# Patient Record
Sex: FEMALE | Race: WHITE | NOT HISPANIC OR LATINO
[De-identification: names, ages, dates, MRNs, and addresses within clinical notes are randomized per-mention and may not be internally consistent; named-entity substitution may affect disease eponyms.]

---

## 2019-01-09 ENCOUNTER — APPOINTMENT (OUTPATIENT)
Dept: INTERNAL MEDICINE | Facility: CLINIC | Age: 64
End: 2019-01-09
Payer: COMMERCIAL

## 2019-01-09 ENCOUNTER — LABORATORY RESULT (OUTPATIENT)
Age: 64
End: 2019-01-09

## 2019-01-09 VITALS
OXYGEN SATURATION: 98 % | WEIGHT: 109 LBS | DIASTOLIC BLOOD PRESSURE: 70 MMHG | HEART RATE: 73 BPM | BODY MASS INDEX: 20.58 KG/M2 | SYSTOLIC BLOOD PRESSURE: 110 MMHG | TEMPERATURE: 98.6 F | HEIGHT: 61 IN

## 2019-01-09 DIAGNOSIS — Z86.19 PERSONAL HISTORY OF OTHER INFECTIOUS AND PARASITIC DISEASES: ICD-10-CM

## 2019-01-09 DIAGNOSIS — Z78.9 OTHER SPECIFIED HEALTH STATUS: ICD-10-CM

## 2019-01-09 DIAGNOSIS — Z82.49 FAMILY HISTORY OF ISCHEMIC HEART DISEASE AND OTHER DISEASES OF THE CIRCULATORY SYSTEM: ICD-10-CM

## 2019-01-09 DIAGNOSIS — Z80.8 FAMILY HISTORY OF MALIGNANT NEOPLASM OF OTHER ORGANS OR SYSTEMS: ICD-10-CM

## 2019-01-09 DIAGNOSIS — Z80.42 FAMILY HISTORY OF MALIGNANT NEOPLASM OF PROSTATE: ICD-10-CM

## 2019-01-09 DIAGNOSIS — Z83.518 FAMILY HISTORY OF OTHER SPECIFIED EYE DISORDER: ICD-10-CM

## 2019-01-09 DIAGNOSIS — Z82.61 FAMILY HISTORY OF ARTHRITIS: ICD-10-CM

## 2019-01-09 PROCEDURE — 99203 OFFICE O/P NEW LOW 30 MIN: CPT

## 2019-01-09 NOTE — PAST MEDICAL HISTORY
[Menopause Age____] : age at menopause was [unfilled] [Total Preg ___] : G[unfilled] [Living ___] : Living: [unfilled] [AB Spont ___] : miscarriages: [unfilled]

## 2019-01-11 PROBLEM — Z80.8 FAMILY HISTORY OF MALIGNANT MELANOMA: Status: ACTIVE | Noted: 2019-01-11

## 2019-01-11 PROBLEM — Z80.42 FAMILY HISTORY OF MALIGNANT NEOPLASM OF PROSTATE: Status: ACTIVE | Noted: 2019-01-11

## 2019-01-11 PROBLEM — Z82.49 FAMILY HISTORY OF CORONARY ARTERY DISEASE: Status: ACTIVE | Noted: 2019-01-11

## 2019-01-11 PROBLEM — Z83.518 FAMILY HISTORY OF MACULAR DEGENERATION: Status: ACTIVE | Noted: 2019-01-11

## 2019-01-11 PROBLEM — Z82.49 FAMILY HISTORY OF HYPERTENSION: Status: ACTIVE | Noted: 2019-01-11

## 2019-01-11 PROBLEM — Z82.49 FAMILY HISTORY OF CARDIAC PACEMAKER: Status: ACTIVE | Noted: 2019-01-11

## 2019-01-11 PROBLEM — Z82.61 FAMILY HISTORY OF RHEUMATOID ARTHRITIS: Status: ACTIVE | Noted: 2019-01-11

## 2019-01-11 LAB
APPEARANCE: CLEAR
BACTERIA UR CULT: NORMAL
BILIRUBIN URINE: NEGATIVE
BLOOD URINE: ABNORMAL
COLOR: YELLOW
GLUCOSE QUALITATIVE U: NEGATIVE MG/DL
KETONES URINE: ABNORMAL
LEUKOCYTE ESTERASE URINE: NEGATIVE
NITRITE URINE: NEGATIVE
PH URINE: 5.5
PROTEIN URINE: NEGATIVE MG/DL
SPECIFIC GRAVITY URINE: 1.03
UROBILINOGEN URINE: NEGATIVE MG/DL
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
## 2019-01-11 NOTE — HEALTH RISK ASSESSMENT
[Patient reported mammogram was normal] : Patient reported mammogram was normal [Patient reported PAP Smear was normal] : Patient reported PAP Smear was normal [Patient reported colonoscopy was normal] : Patient reported colonoscopy was normal [0] : 2) Feeling down, depressed, or hopeless: Not at all (0) [MammogramDate] : 12/31/18 [PapSmearDate] : 2016 [BoneDensityDate] : 12/31/18 [BoneDensityComments] : osteoporosis [ColonoscopyDate] : 2012

## 2019-01-11 NOTE — PHYSICAL EXAM
[No Acute Distress] : no acute distress [Well-Appearing] : well-appearing [Normal Sclera/Conjunctiva] : normal sclera/conjunctiva [PERRL] : pupils equal round and reactive to light [EOMI] : extraocular movements intact [Normal Outer Ear/Nose] : the outer ears and nose were normal in appearance [Normal Oropharynx] : the oropharynx was normal [Normal TMs] : both tympanic membranes were normal [Normal Nasal Mucosa] : the nasal mucosa was normal [Supple] : supple [No Lymphadenopathy] : no lymphadenopathy [Thyroid Normal, No Nodules] : the thyroid was normal and there were no nodules present [No Respiratory Distress] : no respiratory distress  [Clear to Auscultation] : lungs were clear to auscultation bilaterally [Normal Rate] : normal rate  [Regular Rhythm] : with a regular rhythm [Normal S1, S2] : normal S1 and S2 [No Murmur] : no murmur heard [Pedal Pulses Present] : the pedal pulses are present [No Edema] : there was no peripheral edema [Soft] : abdomen soft [Non Tender] : non-tender [No HSM] : no HSM [Normal Supraclavicular Nodes] : no supraclavicular lymphadenopathy [Normal Posterior Cervical Nodes] : no posterior cervical lymphadenopathy [Normal Anterior Cervical Nodes] : no anterior cervical lymphadenopathy [No CVA Tenderness] : no CVA  tenderness [No Spinal Tenderness] : no spinal tenderness [No Joint Swelling] : no joint swelling [Grossly Normal Strength/Tone] : grossly normal strength/tone [No Rash] : no rash [Normal Gait] : normal gait [No Focal Deficits] : no focal deficits [Normal Affect] : the affect was normal [Alert and Oriented x3] : oriented to person, place, and time [de-identified] : scattered freckles

## 2019-01-11 NOTE — DATA REVIEWED
[FreeTextEntry1] :  outside labs- to be scanned into chart\par \par 12/19/18\par TSH 3.69, wnl\par FT41.1, wnl\par UA: trace blood, no prt (no micro)\par Tchol 217 TG 72  HDL 92\par A1c 5.3\par cmp wnl\par cbc wnl\par \par IFOBT: negative\par

## 2019-01-11 NOTE — REVIEW OF SYSTEMS
[Negative] : Psychiatric [Chest Pain] : no chest pain [Palpitations] : no palpitations [Cough] : no cough [Dyspnea on Exertion] : no dyspnea on exertion [Dizziness] : no dizziness

## 2019-01-11 NOTE — ASSESSMENT
[FreeTextEntry1] : \par microscopic hematuria- noted on screening labs by prior PMD (no micro); asx\par -check UA with micro and Ucx\par -advised  eval if repeat remains able, agreeable\par \par osteoporosis-\par -hx DEXA 12/18, to be scanned into chart.  Repeat in 2 yrs.\par -to start alendronate soon, SEs discussed\par -on ca/D\par -exercise and low ETOH intake advised\par \par HLD- 12/18 Tchol 217 TG 72  HDL 92, LFTs wnl\par -on simvastatin 5mg\par -low fat diet, exercise advised\par -wants to check lipids in 6 months, will f/u in office\par \par \par HCM\par -hx CPE 11/18 by prior PMD, yearly advised\par -pt defers hep C screening to next labs\par -hx flu shot 11/18\par -hx Tdap 2015\par -hx prevnar 11/18, for PVX in 1 yr\par -advised to check on insurance coverage for shingles vaccine and f/u or go to pharm if desires\par -hx negative PAP 2016 by GYN, Dr. Duran\par -hx negative mammo 12/18, report to be scanned into chart\par -hx nl screening colonoscopy 2012, told to repeat in 10 yrs per pt.  Asked to forward records. 12/18 IFOBT negative\par -derm referral for screening (+FH melanoma).  Regular use of sun block for skin cancer prevention advised.\par -yearly dental screening advised, has own\par -yearly eye screening advised, has own seen 12/18\par \par \par Pt's cell: [phone]

## 2019-02-14 ENCOUNTER — TRANSCRIPTION ENCOUNTER (OUTPATIENT)
Age: 64
End: 2019-02-14

## 2019-03-04 ENCOUNTER — APPOINTMENT (OUTPATIENT)
Dept: UROLOGY | Facility: CLINIC | Age: 64
End: 2019-03-04
Payer: COMMERCIAL

## 2019-03-04 VITALS
DIASTOLIC BLOOD PRESSURE: 83 MMHG | HEART RATE: 96 BPM | RESPIRATION RATE: 16 BRPM | SYSTOLIC BLOOD PRESSURE: 126 MMHG | BODY MASS INDEX: 20.77 KG/M2 | TEMPERATURE: 98.2 F | WEIGHT: 110 LBS | HEIGHT: 61 IN

## 2019-03-04 PROCEDURE — 99203 OFFICE O/P NEW LOW 30 MIN: CPT

## 2019-03-04 NOTE — ASSESSMENT
[FreeTextEntry1] : \par \par Impression/Plan:63  y.o   2 , 2 miscarriages , woman referred by her Internist Dr Cornejo, for first time episode of microscopic hematuria , 5 RBC on Microscopic analysis of urine, neg urine cx. No radiologic imaging done and denies gross hematuria.Denies urologic hx , denies smoking hx. Works as a  professor. \par 1.CT Urogram.Call for results .\par 2.RTO for cystoscopy.Urine for cytology today and call for results in 3 days. \par

## 2019-03-04 NOTE — PHYSICAL EXAM
[General Appearance - Well Developed] : well developed [Normal Appearance] : normal appearance [Well Groomed] : well groomed [] : no respiratory distress [Oriented To Time, Place, And Person] : oriented to person, place, and time

## 2019-03-04 NOTE — HISTORY OF PRESENT ILLNESS
[FreeTextEntry1] : 63  y.o   2 , 2 miscarriages , woman referred by her Internist Dr Cornejo, for first time episode of microscopic hematuria , 5 RBC on Microscopic analysis of urine, neg urine cx. No radiologic imaging done and denies gross hematuria.Denies urologic hx .FMH brother with PCa .\par \par Evaluation of Voiding Symptoms:\par \par Force of stream: strong. \par Hesitancy: 0\par Intermittency: 0\par Dribblin\par Daytime frequency: 3-4 h\par Night time frequency: 0-1\par Dysuria: 0\par Urgency: 0\par Urge Incontinence: 0\par Stress Incontinence: 0\par Pad usage: 0\par Straining to void: 0\par Incomplete emptyin\par UTIs:  0\par Hematuria: 0\par Stone disease:0\par STD: 0\par Bowel issues: 0\par \par

## 2019-03-05 ENCOUNTER — OTHER (OUTPATIENT)
Age: 64
End: 2019-03-05

## 2019-03-05 LAB — URINE CYTOLOGY: NORMAL

## 2019-03-07 ENCOUNTER — TRANSCRIPTION ENCOUNTER (OUTPATIENT)
Age: 64
End: 2019-03-07

## 2019-03-11 ENCOUNTER — FORM ENCOUNTER (OUTPATIENT)
Age: 64
End: 2019-03-11

## 2019-03-12 ENCOUNTER — APPOINTMENT (OUTPATIENT)
Dept: CT IMAGING | Facility: IMAGING CENTER | Age: 64
End: 2019-03-12
Payer: COMMERCIAL

## 2019-03-12 ENCOUNTER — OUTPATIENT (OUTPATIENT)
Dept: OUTPATIENT SERVICES | Facility: HOSPITAL | Age: 64
LOS: 1 days | End: 2019-03-12
Payer: COMMERCIAL

## 2019-03-12 DIAGNOSIS — R31.29 OTHER MICROSCOPIC HEMATURIA: ICD-10-CM

## 2019-03-12 PROCEDURE — 74178 CT ABD&PLV WO CNTR FLWD CNTR: CPT

## 2019-03-12 PROCEDURE — 82565 ASSAY OF CREATININE: CPT

## 2019-03-12 PROCEDURE — 74178 CT ABD&PLV WO CNTR FLWD CNTR: CPT | Mod: 26

## 2019-04-11 ENCOUNTER — OUTPATIENT (OUTPATIENT)
Dept: OUTPATIENT SERVICES | Facility: HOSPITAL | Age: 64
LOS: 1 days | End: 2019-04-11
Payer: COMMERCIAL

## 2019-04-11 ENCOUNTER — APPOINTMENT (OUTPATIENT)
Dept: UROLOGY | Facility: CLINIC | Age: 64
End: 2019-04-11
Payer: COMMERCIAL

## 2019-04-11 VITALS
SYSTOLIC BLOOD PRESSURE: 158 MMHG | HEART RATE: 67 BPM | RESPIRATION RATE: 16 BRPM | WEIGHT: 110 LBS | HEIGHT: 61 IN | BODY MASS INDEX: 20.77 KG/M2 | DIASTOLIC BLOOD PRESSURE: 90 MMHG

## 2019-04-11 DIAGNOSIS — R35.0 FREQUENCY OF MICTURITION: ICD-10-CM

## 2019-04-11 PROCEDURE — 52000 CYSTOURETHROSCOPY: CPT

## 2019-04-15 DIAGNOSIS — R31.29 OTHER MICROSCOPIC HEMATURIA: ICD-10-CM

## 2019-06-10 ENCOUNTER — TRANSCRIPTION ENCOUNTER (OUTPATIENT)
Age: 64
End: 2019-06-10

## 2019-06-18 ENCOUNTER — LABORATORY RESULT (OUTPATIENT)
Age: 64
End: 2019-06-18

## 2019-06-18 ENCOUNTER — APPOINTMENT (OUTPATIENT)
Dept: DERMATOLOGY | Facility: CLINIC | Age: 64
End: 2019-06-18
Payer: COMMERCIAL

## 2019-06-18 VITALS — BODY MASS INDEX: 20.77 KG/M2 | WEIGHT: 110 LBS | HEIGHT: 61 IN

## 2019-06-18 DIAGNOSIS — D18.01 HEMANGIOMA OF SKIN AND SUBCUTANEOUS TISSUE: ICD-10-CM

## 2019-06-18 DIAGNOSIS — D48.5 NEOPLASM OF UNCERTAIN BEHAVIOR OF SKIN: ICD-10-CM

## 2019-06-18 PROCEDURE — 11102 TANGNTL BX SKIN SINGLE LES: CPT

## 2019-06-18 PROCEDURE — 99204 OFFICE O/P NEW MOD 45 MIN: CPT | Mod: 25

## 2019-07-08 ENCOUNTER — FORM ENCOUNTER (OUTPATIENT)
Age: 64
End: 2019-07-08

## 2019-07-09 ENCOUNTER — APPOINTMENT (OUTPATIENT)
Dept: INTERNAL MEDICINE | Facility: CLINIC | Age: 64
End: 2019-07-09
Payer: COMMERCIAL

## 2019-07-09 ENCOUNTER — RESULT REVIEW (OUTPATIENT)
Age: 64
End: 2019-07-09

## 2019-07-09 ENCOUNTER — APPOINTMENT (OUTPATIENT)
Dept: OBGYN | Facility: CLINIC | Age: 64
End: 2019-07-09
Payer: COMMERCIAL

## 2019-07-09 VITALS
SYSTOLIC BLOOD PRESSURE: 144 MMHG | OXYGEN SATURATION: 98 % | HEIGHT: 61 IN | DIASTOLIC BLOOD PRESSURE: 80 MMHG | HEART RATE: 64 BPM | WEIGHT: 110 LBS | TEMPERATURE: 98.1 F | RESPIRATION RATE: 16 BRPM | BODY MASS INDEX: 20.77 KG/M2

## 2019-07-09 PROCEDURE — 36415 COLL VENOUS BLD VENIPUNCTURE: CPT

## 2019-07-09 PROCEDURE — 99214 OFFICE O/P EST MOD 30 MIN: CPT | Mod: 25

## 2019-07-09 PROCEDURE — 99397 PER PM REEVAL EST PAT 65+ YR: CPT

## 2019-07-09 NOTE — REVIEW OF SYSTEMS
[Negative] : Psychiatric [Cough] : no cough [Palpitations] : no palpitations [Chest Pain] : no chest pain [Dyspnea on Exertion] : no dyspnea on exertion [Dizziness] : no dizziness

## 2019-07-09 NOTE — PHYSICAL EXAM
[No Acute Distress] : no acute distress [Well-Appearing] : well-appearing [Normal Sclera/Conjunctiva] : normal sclera/conjunctiva [PERRL] : pupils equal round and reactive to light [EOMI] : extraocular movements intact [Normal Oropharynx] : the oropharynx was normal [Normal Outer Ear/Nose] : the outer ears and nose were normal in appearance [Normal Nasal Mucosa] : the nasal mucosa was normal [Supple] : supple [No Lymphadenopathy] : no lymphadenopathy [Thyroid Normal, No Nodules] : the thyroid was normal and there were no nodules present [No Respiratory Distress] : no respiratory distress  [Clear to Auscultation] : lungs were clear to auscultation bilaterally [Normal Rate] : normal rate  [Regular Rhythm] : with a regular rhythm [Normal S1, S2] : normal S1 and S2 [No Murmur] : no murmur heard [Pedal Pulses Present] : the pedal pulses are present [No Edema] : there was no peripheral edema [Soft] : abdomen soft [Non Tender] : non-tender [No HSM] : no HSM [Normal Supraclavicular Nodes] : no supraclavicular lymphadenopathy [Normal Posterior Cervical Nodes] : no posterior cervical lymphadenopathy [Normal Anterior Cervical Nodes] : no anterior cervical lymphadenopathy [No CVA Tenderness] : no CVA  tenderness [No Spinal Tenderness] : no spinal tenderness [No Joint Swelling] : no joint swelling [Grossly Normal Strength/Tone] : grossly normal strength/tone [No Rash] : no rash [Normal Gait] : normal gait [No Focal Deficits] : no focal deficits [Normal Affect] : the affect was normal [Alert and Oriented x3] : oriented to person, place, and time [de-identified] : scattered freckles

## 2019-07-09 NOTE — HEALTH RISK ASSESSMENT
[Hepatitis C test offered] : Hepatitis C test offered [Fully functional (bathing, dressing, toileting, transferring, walking, feeding)] : Fully functional (bathing, dressing, toileting, transferring, walking, feeding) [Sunscreen] : uses sunscreen [Fully functional (using the telephone, shopping, preparing meals, housekeeping, doing laundry, using] : Fully functional and needs no help or supervision to perform IADLs (using the telephone, shopping, preparing meals, housekeeping, doing laundry, using transportation, managing medications and managing finances)
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
## 2019-07-09 NOTE — ASSESSMENT
[FreeTextEntry1] : \par hx microscopic hematuria- noted on screening labs by prior PMD (no micro); asx\par -1/19 UA +rbc, Ucx negative\par -followed by , seen 3/19 with CT A/P and cystoscopy advised f/u yearly\par -3/19 CT A/P: no mass/stones, small left renal cyst\par -4/19 cystoscopy negative\par \par osteoporosis-\par -hx DEXA 12/18, for repeat in 2 yrs.\par -on alendronate since 1/19\par -on ca/D\par -exercise and low ETOH intake advised\par \par HLD- 12/18 Tchol 217 TG 72  HDL 92, LFTs wnl\par -on simvastatin 5mg\par -low fat diet, exercise advised\par -check lipids \par \par BCC-\par -followed by derm, has f/u 7/17/19 re: MOHS procedure for removal\par -regular use of sun block for skin cancer prevention advised.\par \par \par HCM\par -hx CPE 11/18 by prior PMD, yearly advised\par -check hep C screening \par -hx flu shot 11/18\par -hx Tdap 2015\par -hx prevnar 11/18, for PVX in 1 yr\par -advised to check on insurance coverage for shingles vaccine and f/u or go to pharm if desires\par -hx negative PAP 2016 by GYN, Dr. Duran- has f/u today\par -hx negative mammo 12/18\par -hx nl screening colonoscopy 2012, told to repeat in 10 yrs per pt.  Asked to forward records. 12/18 IFOBT negative\par \par \par Pt's cell: [phone]

## 2019-07-10 LAB
CHOLEST SERPL-MCNC: 194 MG/DL
CHOLEST/HDLC SERPL: 2.3 RATIO
HCV AB SER QL: NONREACTIVE
HCV S/CO RATIO: 0.25 S/CO
HDLC SERPL-MCNC: 84 MG/DL
LDLC SERPL CALC-MCNC: 97 MG/DL
TRIGL SERPL-MCNC: 67 MG/DL

## 2019-07-17 ENCOUNTER — APPOINTMENT (OUTPATIENT)
Dept: DERMATOLOGY | Facility: CLINIC | Age: 64
End: 2019-07-17
Payer: COMMERCIAL

## 2019-07-17 PROCEDURE — 99214 OFFICE O/P EST MOD 30 MIN: CPT

## 2019-08-19 ENCOUNTER — APPOINTMENT (OUTPATIENT)
Dept: DERMATOLOGY | Facility: CLINIC | Age: 64
End: 2019-08-19
Payer: COMMERCIAL

## 2019-08-19 PROCEDURE — 13152 CMPLX RPR E/N/E/L 2.6-7.5 CM: CPT

## 2019-08-19 PROCEDURE — 17311 MOHS 1 STAGE H/N/HF/G: CPT

## 2019-08-26 ENCOUNTER — APPOINTMENT (OUTPATIENT)
Dept: DERMATOLOGY | Facility: CLINIC | Age: 64
End: 2019-08-26
Payer: COMMERCIAL

## 2019-08-26 PROCEDURE — 99024 POSTOP FOLLOW-UP VISIT: CPT

## 2019-10-05 ENCOUNTER — TRANSCRIPTION ENCOUNTER (OUTPATIENT)
Age: 64
End: 2019-10-05

## 2019-10-07 RX ORDER — SIMVASTATIN 5 MG/1
5 TABLET, FILM COATED ORAL
Qty: 90 | Refills: 1 | Status: DISCONTINUED | COMMUNITY
Start: 1900-01-01 | End: 2019-10-07

## 2019-10-08 RX ORDER — LOVASTATIN 10 MG/1
10 TABLET ORAL
Qty: 30 | Refills: 3 | Status: DISCONTINUED | COMMUNITY
Start: 2019-10-07 | End: 2019-10-08

## 2019-11-24 ENCOUNTER — TRANSCRIPTION ENCOUNTER (OUTPATIENT)
Age: 64
End: 2019-11-24

## 2020-01-04 ENCOUNTER — TRANSCRIPTION ENCOUNTER (OUTPATIENT)
Age: 65
End: 2020-01-04

## 2020-01-13 DIAGNOSIS — Z85.828 PERSONAL HISTORY OF OTHER MALIGNANT NEOPLASM OF SKIN: ICD-10-CM

## 2020-01-14 ENCOUNTER — APPOINTMENT (OUTPATIENT)
Dept: INTERNAL MEDICINE | Facility: CLINIC | Age: 65
End: 2020-01-14
Payer: COMMERCIAL

## 2020-01-14 ENCOUNTER — LABORATORY RESULT (OUTPATIENT)
Age: 65
End: 2020-01-14

## 2020-01-14 VITALS
TEMPERATURE: 98.1 F | DIASTOLIC BLOOD PRESSURE: 80 MMHG | BODY MASS INDEX: 20.01 KG/M2 | SYSTOLIC BLOOD PRESSURE: 132 MMHG | WEIGHT: 106 LBS | HEART RATE: 65 BPM | RESPIRATION RATE: 16 BRPM | HEIGHT: 61 IN | OXYGEN SATURATION: 98 %

## 2020-01-14 PROCEDURE — 99214 OFFICE O/P EST MOD 30 MIN: CPT | Mod: 25

## 2020-01-14 PROCEDURE — 36415 COLL VENOUS BLD VENIPUNCTURE: CPT
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
## 2020-01-14 NOTE — ASSESSMENT
[FreeTextEntry1] : \par osteoporosis-\par -hx DEXA 12/18, for repeat in 2 yrs.\par -on alendronate since 1/19\par -on ca/D\par -exercise and low ETOH intake advised\par \par HLD- 7/19 Tchol 194 (was 217) TG 67  HDL 84, 12/18 LFTs wnl\par -on simvastatin 5mg\par -low fat diet, exercise advised\par -check lipids \par \par BCC- is s/p MOHS 8/19\par -followed by derm, seen 8/19 with suture removal, to f/u q 6mo per pt- pending 2/20\par -regular use of sun block for skin cancer prevention advised.\par \par hx microscopic hematuria- noted on screening labs by prior PMD (no micro); asx\par -1/19 UA +rbc, Ucx negative\par -followed by , seen 3/19 with CT A/P and cystoscopy advised f/u yearly- pending\par -3/19 CT A/P: no mass/stones, small left renal cyst\par -4/19 cystoscopy negative\par -check UA\par \par \par HCM\par -hx CPE 11/18 by prior PMD, yearly advised\par -check screening labs; declines HIV/STD screening\par -7/19 hep C screening negative\par -hx flu shot 10/19\par -hx Tdap 2015\par -hx prevnar 11/18, for PVX in 1 yr\par -advised to check on insurance coverage for shingles vaccine and f/u or go to pharm if desires\par -hx negative PAP 7/19 by GYN, Dr. Duran\par -hx negative mammo 12/18, Rx for repeat given\par -hx nl screening colonoscopy 2012, told to repeat in 10 yrs per pt.  Asked to forward records. 12/18 IFOBT negative- for repeat\par \par \par Pt's cell: [phone]

## 2020-01-14 NOTE — REVIEW OF SYSTEMS
[Negative] : Psychiatric [Palpitations] : no palpitations [Dyspnea on Exertion] : no dyspnea on exertion [Dizziness] : no dizziness

## 2020-01-14 NOTE — PHYSICAL EXAM
[No Acute Distress] : no acute distress [Normal Sclera/Conjunctiva] : normal sclera/conjunctiva [PERRL] : pupils equal round and reactive to light [Well-Appearing] : well-appearing [EOMI] : extraocular movements intact [Normal Outer Ear/Nose] : the outer ears and nose were normal in appearance [Normal Oropharynx] : the oropharynx was normal [Supple] : supple [Normal Nasal Mucosa] : the nasal mucosa was normal [No Lymphadenopathy] : no lymphadenopathy [Clear to Auscultation] : lungs were clear to auscultation bilaterally [Thyroid Normal, No Nodules] : the thyroid was normal and there were no nodules present [No Respiratory Distress] : no respiratory distress  [Regular Rhythm] : with a regular rhythm [Normal Rate] : normal rate  [Pedal Pulses Present] : the pedal pulses are present [No Murmur] : no murmur heard [Normal S1, S2] : normal S1 and S2 [Soft] : abdomen soft [No Edema] : there was no peripheral edema [Non Tender] : non-tender [Normal Supraclavicular Nodes] : no supraclavicular lymphadenopathy [No HSM] : no HSM [Normal Posterior Cervical Nodes] : no posterior cervical lymphadenopathy [No CVA Tenderness] : no CVA  tenderness [Normal Anterior Cervical Nodes] : no anterior cervical lymphadenopathy [No Joint Swelling] : no joint swelling [No Spinal Tenderness] : no spinal tenderness [Grossly Normal Strength/Tone] : grossly normal strength/tone [Normal Gait] : normal gait [No Focal Deficits] : no focal deficits [No Rash] : no rash [Alert and Oriented x3] : oriented to person, place, and time [Normal Affect] : the affect was normal [de-identified] : scattered freckles

## 2020-01-17 LAB
25(OH)D3 SERPL-MCNC: 35 NG/ML
ALBUMIN SERPL ELPH-MCNC: 4.4 G/DL
ALP BLD-CCNC: 56 U/L
ALT SERPL-CCNC: 20 U/L
ANION GAP SERPL CALC-SCNC: 13 MMOL/L
APPEARANCE: CLEAR
AST SERPL-CCNC: 40 U/L
BACTERIA: NEGATIVE
BASOPHILS # BLD AUTO: 0.03 K/UL
BASOPHILS NFR BLD AUTO: 0.5 %
BILIRUB SERPL-MCNC: 0.5 MG/DL
BILIRUBIN URINE: NEGATIVE
BLOOD URINE: ABNORMAL
BUN SERPL-MCNC: 20 MG/DL
CALCIUM SERPL-MCNC: 9.6 MG/DL
CHLORIDE SERPL-SCNC: 98 MMOL/L
CHOLEST SERPL-MCNC: 203 MG/DL
CHOLEST/HDLC SERPL: 2.3 RATIO
CO2 SERPL-SCNC: 27 MMOL/L
COLOR: NORMAL
CREAT SERPL-MCNC: 0.77 MG/DL
EOSINOPHIL # BLD AUTO: 0.1 K/UL
EOSINOPHIL NFR BLD AUTO: 1.8 %
ESTIMATED AVERAGE GLUCOSE: 105 MG/DL
GLUCOSE QUALITATIVE U: NEGATIVE
GLUCOSE SERPL-MCNC: 83 MG/DL
HBA1C MFR BLD HPLC: 5.3 %
HCT VFR BLD CALC: 42.1 %
HDLC SERPL-MCNC: 89 MG/DL
HGB BLD-MCNC: 13.7 G/DL
HYALINE CASTS: 2 /LPF
IMM GRANULOCYTES NFR BLD AUTO: 0.2 %
KETONES URINE: NEGATIVE
LDLC SERPL CALC-MCNC: 96 MG/DL
LEUKOCYTE ESTERASE URINE: NEGATIVE
LYMPHOCYTES # BLD AUTO: 1.94 K/UL
LYMPHOCYTES NFR BLD AUTO: 34.6 %
MAN DIFF?: NORMAL
MCHC RBC-ENTMCNC: 31.4 PG
MCHC RBC-ENTMCNC: 32.5 GM/DL
MCV RBC AUTO: 96.6 FL
MICROSCOPIC-UA: NORMAL
MONOCYTES # BLD AUTO: 0.66 K/UL
MONOCYTES NFR BLD AUTO: 11.8 %
NEUTROPHILS # BLD AUTO: 2.87 K/UL
NEUTROPHILS NFR BLD AUTO: 51.1 %
NITRITE URINE: NEGATIVE
PH URINE: 6
PLATELET # BLD AUTO: 141 K/UL
POTASSIUM SERPL-SCNC: 5 MMOL/L
PROT SERPL-MCNC: 7.1 G/DL
PROTEIN URINE: NEGATIVE
RBC # BLD: 4.36 M/UL
RBC # FLD: 12.2 %
RED BLOOD CELLS URINE: 3 /HPF
SODIUM SERPL-SCNC: 138 MMOL/L
SPECIFIC GRAVITY URINE: 1.02
SQUAMOUS EPITHELIAL CELLS: 1 /HPF
TRIGL SERPL-MCNC: 92 MG/DL
TSH SERPL-ACNC: 7.91 UIU/ML
UROBILINOGEN URINE: NORMAL
WBC # FLD AUTO: 5.61 K/UL
WHITE BLOOD CELLS URINE: 1 /HPF

## 2020-01-17 RX ORDER — SIMVASTATIN 5 MG/1
5 TABLET, FILM COATED ORAL
Qty: 90 | Refills: 1 | Status: DISCONTINUED | COMMUNITY
Start: 2019-10-08 | End: 2020-01-17

## 2020-01-30 ENCOUNTER — TRANSCRIPTION ENCOUNTER (OUTPATIENT)
Age: 65
End: 2020-01-30

## 2020-02-21 LAB
BASOPHILS # BLD AUTO: 0.06 K/UL
BASOPHILS NFR BLD AUTO: 0.6 %
EOSINOPHIL # BLD AUTO: 0.07 K/UL
EOSINOPHIL NFR BLD AUTO: 0.7 %
HCT VFR BLD CALC: 43.1 %
HGB BLD-MCNC: 14.2 G/DL
IMM GRANULOCYTES NFR BLD AUTO: 0.4 %
LYMPHOCYTES # BLD AUTO: 2.15 K/UL
LYMPHOCYTES NFR BLD AUTO: 21.9 %
MAN DIFF?: NORMAL
MCHC RBC-ENTMCNC: 31.2 PG
MCHC RBC-ENTMCNC: 32.9 GM/DL
MCV RBC AUTO: 94.7 FL
MONOCYTES # BLD AUTO: 0.6 K/UL
MONOCYTES NFR BLD AUTO: 6.1 %
NEUTROPHILS # BLD AUTO: 6.91 K/UL
NEUTROPHILS NFR BLD AUTO: 70.3 %
PLATELET # BLD AUTO: 182 K/UL
RBC # BLD: 4.55 M/UL
RBC # FLD: 12.1 %
THYROGLOB AB SERPL-ACNC: <20 IU/ML
THYROPEROXIDASE AB SERPL IA-ACNC: <10 IU/ML
TSH SERPL-ACNC: 3.24 UIU/ML
WBC # FLD AUTO: 9.83 K/UL

## 2020-03-06 LAB — HEMOCCULT STL QL IA: NEGATIVE

## 2020-03-25 ENCOUNTER — TRANSCRIPTION ENCOUNTER (OUTPATIENT)
Age: 65
End: 2020-03-25

## 2020-05-07 ENCOUNTER — TRANSCRIPTION ENCOUNTER (OUTPATIENT)
Age: 65
End: 2020-05-07

## 2020-06-19 ENCOUNTER — TRANSCRIPTION ENCOUNTER (OUTPATIENT)
Age: 65
End: 2020-06-19

## 2020-07-29 ENCOUNTER — APPOINTMENT (OUTPATIENT)
Dept: DERMATOLOGY | Facility: CLINIC | Age: 65
End: 2020-07-29
Payer: COMMERCIAL

## 2020-07-29 DIAGNOSIS — L82.0 INFLAMED SEBORRHEIC KERATOSIS: ICD-10-CM

## 2020-07-29 PROCEDURE — 99213 OFFICE O/P EST LOW 20 MIN: CPT | Mod: 25

## 2020-07-29 PROCEDURE — 17110 DESTRUCTION B9 LES UP TO 14: CPT

## 2020-09-01 ENCOUNTER — APPOINTMENT (OUTPATIENT)
Dept: INTERNAL MEDICINE | Facility: CLINIC | Age: 65
End: 2020-09-01

## 2020-09-16 ENCOUNTER — MED ADMIN CHARGE (OUTPATIENT)
Age: 65
End: 2020-09-16

## 2020-09-16 ENCOUNTER — APPOINTMENT (OUTPATIENT)
Dept: INTERNAL MEDICINE | Facility: CLINIC | Age: 65
End: 2020-09-16
Payer: MEDICARE

## 2020-09-16 PROCEDURE — G0008: CPT

## 2020-09-16 PROCEDURE — 90662 IIV NO PRSV INCREASED AG IM: CPT

## 2020-10-23 ENCOUNTER — TRANSCRIPTION ENCOUNTER (OUTPATIENT)
Age: 65
End: 2020-10-23

## 2020-12-19 ENCOUNTER — TRANSCRIPTION ENCOUNTER (OUTPATIENT)
Age: 65
End: 2020-12-19

## 2020-12-24 ENCOUNTER — RX RENEWAL (OUTPATIENT)
Age: 65
End: 2020-12-24

## 2021-01-10 NOTE — PAST MEDICAL HISTORY
[Postmenopausal] : postmenopausal [Menopause Age____] : age at menopause was [unfilled] [Total Preg ___] : G[unfilled] [Living ___] : Living: [unfilled] [AB Spont ___] : miscarriages: [unfilled]

## 2021-01-11 ENCOUNTER — APPOINTMENT (OUTPATIENT)
Dept: INTERNAL MEDICINE | Facility: CLINIC | Age: 66
End: 2021-01-11
Payer: MEDICARE

## 2021-01-11 ENCOUNTER — LABORATORY RESULT (OUTPATIENT)
Age: 66
End: 2021-01-11

## 2021-01-11 ENCOUNTER — NON-APPOINTMENT (OUTPATIENT)
Age: 66
End: 2021-01-11

## 2021-01-11 VITALS
OXYGEN SATURATION: 98 % | BODY MASS INDEX: 20.01 KG/M2 | TEMPERATURE: 98.5 F | HEIGHT: 61 IN | WEIGHT: 106 LBS | DIASTOLIC BLOOD PRESSURE: 68 MMHG | RESPIRATION RATE: 16 BRPM | HEART RATE: 73 BPM | SYSTOLIC BLOOD PRESSURE: 112 MMHG

## 2021-01-11 DIAGNOSIS — R79.89 OTHER SPECIFIED ABNORMAL FINDINGS OF BLOOD CHEMISTRY: ICD-10-CM

## 2021-01-11 DIAGNOSIS — R31.29 OTHER MICROSCOPIC HEMATURIA: ICD-10-CM

## 2021-01-11 DIAGNOSIS — Z86.2 PERSONAL HISTORY OF DISEASES OF THE BLOOD AND BLOOD-FORMING ORGANS AND CERTAIN DISORDERS INVOLVING THE IMMUNE MECHANISM: ICD-10-CM

## 2021-01-11 PROCEDURE — 93000 ELECTROCARDIOGRAM COMPLETE: CPT | Mod: 59

## 2021-01-11 PROCEDURE — G0444 DEPRESSION SCREEN ANNUAL: CPT

## 2021-01-11 PROCEDURE — 36415 COLL VENOUS BLD VENIPUNCTURE: CPT

## 2021-01-11 PROCEDURE — G0009: CPT

## 2021-01-11 PROCEDURE — 90732 PPSV23 VACC 2 YRS+ SUBQ/IM: CPT

## 2021-01-11 PROCEDURE — 99072 ADDL SUPL MATRL&STAF TM PHE: CPT

## 2021-01-11 PROCEDURE — 99397 PER PM REEVAL EST PAT 65+ YR: CPT | Mod: 25

## 2021-01-11 PROCEDURE — G0402 INITIAL PREVENTIVE EXAM: CPT

## 2021-01-11 PROCEDURE — G0439: CPT

## 2021-01-11 NOTE — ASSESSMENT
[FreeTextEntry1] : \par osteoporosis-\par -hx DEXA 12/18, check repeat \par -on alendronate since 1/19\par -on ca/D\par -exercise and low ETOH intake advised\par \par HLD- 1/20 Tchol 203 TG 92 LDL 96 HDL 89, LFTs wnl.  Asx.\par -screening EKG: NSR @ 60 bmp, nl axis, no LVH/path Q/ST chgs\par -low fat diet, exercise advised\par -check lipids/LFTs\par \par hx BCC at left nasal area is s/p MOHS 8/19-\par -followed by derm, seen 7/20 with FBSE check done to f/u in 1 yr\par -regular skin screening exams with derm and regular use of sun block for skin cancer prevention advised.\par \par hx microscopic hematuria- noted on screening labs by prior PMD (no micro); asx\par -1/20 UA + blood (no rbc)\par -followed by , seen 3/19 with CT A/P and cystoscopy advised f/u yearly- pending\par -3/19 CT A/P: no mass/stones, small left renal cyst\par -4/19 cystoscopy negative\par -check UA\par \par hx abnl TSH 1/20 on screening- asx\par -1/20 TSH 7.9, FT4 wnl\par -2/20 TSH wnl, thyroid AB negative\par -check TSH\par \par MISC:  Continued social distancing and measure for covid19 prevention encouraged.  \par -declines check covid19 AB screen.\par \par \par HCM\par -check screening labs;  declines HIV/STD screening\par -7/19 hep C screening negative\par -hx flu shot 9/20\par -hx Tdap 2015\par -hx prevnar 11/18\par -PVX today\par -advised to check on insurance coverage for shingles vaccine and f/u or go to pharm if desires\par -hx negative PAP 7/19 by GYN, Dr. Duran\par -hx negative mammo 12/18, Rx for repeat given again\par -hx nl screening colonoscopy 2012, told to repeat in 10 yrs per pt.  Asked to forward records. 1/20 FIT negative- check repeat\par -yearly eye and dental screening advised\par -advised to designate HCP and provide copy of completed form for records\par \par \par Pt's cell: [phone]\par Pt declines f/u appt, yearly CPE and f/u as needed advised.\par
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
## 2021-01-11 NOTE — HEALTH RISK ASSESSMENT
[No falls in past year] : Patient reported no falls in the past year [0] : 2) Feeling down, depressed, or hopeless: Not at all (0) [Patient reported mammogram was normal] : Patient reported mammogram was normal [Patient reported PAP Smear was normal] : Patient reported PAP Smear was normal [Patient reported bone density results were abnormal] : Patient reported bone density results were abnormal [Patient reported colonoscopy was normal] : Patient reported colonoscopy was normal [Fully functional (bathing, dressing, toileting, transferring, walking, feeding)] : Fully functional (bathing, dressing, toileting, transferring, walking, feeding) [Fully functional (using the telephone, shopping, preparing meals, housekeeping, doing laundry, using] : Fully functional and needs no help or supervision to perform IADLs (using the telephone, shopping, preparing meals, housekeeping, doing laundry, using transportation, managing medications and managing finances) [Smoke Detector] : smoke detector [Carbon Monoxide Detector] : carbon monoxide detector [Seat Belt] :  uses seat belt [Sunscreen] : uses sunscreen [With Patient/Caregiver] : With Patient/Caregiver [HIV test declined] : HIV test declined [[id number]] : 0 [Guns at Home] : no guns at home [MammogramDate] : 12/18 [PapSmearDate] : 07/19 [BoneDensityDate] : 12/18 [BoneDensityComments] : osteoporosis [ColonoscopyDate] : 01/12 [ColonoscopyComments] : told nl, rec: repeat in 10 yrs per pt; 1/20 FIT negative [HepatitisCDate] : 07/19 [HepatitisCComments] : negative [AdvancecareDate] : 01/21

## 2021-01-11 NOTE — PHYSICAL EXAM
[No Acute Distress] : no acute distress [Well-Appearing] : well-appearing [Normal Sclera/Conjunctiva] : normal sclera/conjunctiva [PERRL] : pupils equal round and reactive to light [EOMI] : extraocular movements intact [Normal Oropharynx] : the oropharynx was normal [Normal Outer Ear/Nose] : the outer ears and nose were normal in appearance [Normal Nasal Mucosa] : the nasal mucosa was normal [Supple] : supple [No Lymphadenopathy] : no lymphadenopathy [Thyroid Normal, No Nodules] : the thyroid was normal and there were no nodules present [No Respiratory Distress] : no respiratory distress  [Clear to Auscultation] : lungs were clear to auscultation bilaterally [Normal Rate] : normal rate  [Regular Rhythm] : with a regular rhythm [Normal S1, S2] : normal S1 and S2 [No Murmur] : no murmur heard [Pedal Pulses Present] : the pedal pulses are present [No Edema] : there was no peripheral edema [Soft] : abdomen soft [Non Tender] : non-tender [No HSM] : no HSM [Normal Posterior Cervical Nodes] : no posterior cervical lymphadenopathy [Normal Supraclavicular Nodes] : no supraclavicular lymphadenopathy [Normal Anterior Cervical Nodes] : no anterior cervical lymphadenopathy [No CVA Tenderness] : no CVA  tenderness [No Spinal Tenderness] : no spinal tenderness [No Joint Swelling] : no joint swelling [Grossly Normal Strength/Tone] : grossly normal strength/tone [No Rash] : no rash [Normal Gait] : normal gait [No Focal Deficits] : no focal deficits [Normal Affect] : the affect was normal [Alert and Oriented x3] : oriented to person, place, and time [Normal TMs] : both tympanic membranes were normal [de-identified] : scattered freckles

## 2021-01-12 DIAGNOSIS — Z23 ENCOUNTER FOR IMMUNIZATION: ICD-10-CM

## 2021-01-14 LAB
25(OH)D3 SERPL-MCNC: 54.2 NG/ML
ALBUMIN SERPL ELPH-MCNC: 4.8 G/DL
ALP BLD-CCNC: 61 U/L
ALT SERPL-CCNC: 13 U/L
ANION GAP SERPL CALC-SCNC: 14 MMOL/L
APPEARANCE: CLEAR
AST SERPL-CCNC: 27 U/L
BACTERIA: NEGATIVE
BASOPHILS # BLD AUTO: 0.06 K/UL
BASOPHILS NFR BLD AUTO: 0.8 %
BILIRUB SERPL-MCNC: 0.7 MG/DL
BILIRUBIN URINE: NEGATIVE
BLOOD URINE: NEGATIVE
BUN SERPL-MCNC: 20 MG/DL
CALCIUM SERPL-MCNC: 10.4 MG/DL
CHLORIDE SERPL-SCNC: 98 MMOL/L
CHOLEST SERPL-MCNC: 222 MG/DL
CO2 SERPL-SCNC: 27 MMOL/L
COLOR: NORMAL
CREAT SERPL-MCNC: 0.79 MG/DL
EOSINOPHIL # BLD AUTO: 0.04 K/UL
EOSINOPHIL NFR BLD AUTO: 0.5 %
ESTIMATED AVERAGE GLUCOSE: 103 MG/DL
GLUCOSE QUALITATIVE U: NEGATIVE
GLUCOSE SERPL-MCNC: 67 MG/DL
HBA1C MFR BLD HPLC: 5.2 %
HCT VFR BLD CALC: 43.9 %
HDLC SERPL-MCNC: 96 MG/DL
HGB BLD-MCNC: 14 G/DL
HYALINE CASTS: 0 /LPF
IMM GRANULOCYTES NFR BLD AUTO: 0.3 %
KETONES URINE: NORMAL
LDLC SERPL CALC-MCNC: 110 MG/DL
LEUKOCYTE ESTERASE URINE: NEGATIVE
LYMPHOCYTES # BLD AUTO: 2.09 K/UL
LYMPHOCYTES NFR BLD AUTO: 27.4 %
MAN DIFF?: NORMAL
MCHC RBC-ENTMCNC: 31.3 PG
MCHC RBC-ENTMCNC: 31.9 GM/DL
MCV RBC AUTO: 98.2 FL
MICROSCOPIC-UA: NORMAL
MONOCYTES # BLD AUTO: 0.52 K/UL
MONOCYTES NFR BLD AUTO: 6.8 %
NEUTROPHILS # BLD AUTO: 4.91 K/UL
NEUTROPHILS NFR BLD AUTO: 64.2 %
NITRITE URINE: NEGATIVE
NONHDLC SERPL-MCNC: 126 MG/DL
PH URINE: 6
PLATELET # BLD AUTO: NORMAL K/UL
POTASSIUM SERPL-SCNC: 4.2 MMOL/L
PROT SERPL-MCNC: 7.3 G/DL
PROTEIN URINE: NEGATIVE
RBC # BLD: 4.47 M/UL
RBC # FLD: 12.5 %
RED BLOOD CELLS URINE: 1 /HPF
SODIUM SERPL-SCNC: 139 MMOL/L
SPECIFIC GRAVITY URINE: 1.01
SQUAMOUS EPITHELIAL CELLS: 0 /HPF
TRIGL SERPL-MCNC: 80 MG/DL
TSH SERPL-ACNC: 3.09 UIU/ML
UROBILINOGEN URINE: NORMAL
WBC # FLD AUTO: 7.64 K/UL
WHITE BLOOD CELLS URINE: 0 /HPF

## 2021-03-19 ENCOUNTER — TRANSCRIPTION ENCOUNTER (OUTPATIENT)
Age: 66
End: 2021-03-19

## 2021-05-18 ENCOUNTER — APPOINTMENT (OUTPATIENT)
Dept: INTERNAL MEDICINE | Facility: CLINIC | Age: 66
End: 2021-05-18
Payer: MEDICARE

## 2021-05-18 VITALS
OXYGEN SATURATION: 98 % | SYSTOLIC BLOOD PRESSURE: 110 MMHG | TEMPERATURE: 98.4 F | DIASTOLIC BLOOD PRESSURE: 70 MMHG | BODY MASS INDEX: 18.88 KG/M2 | WEIGHT: 100 LBS | HEART RATE: 88 BPM | HEIGHT: 61 IN

## 2021-05-18 DIAGNOSIS — Z12.83 ENCOUNTER FOR SCREENING FOR MALIGNANT NEOPLASM OF SKIN: ICD-10-CM

## 2021-05-18 PROCEDURE — 99213 OFFICE O/P EST LOW 20 MIN: CPT

## 2021-05-18 NOTE — HISTORY OF PRESENT ILLNESS
[FreeTextEntry8] : White toe nails, present for a few months, worried that it is a fungal infection.  Patient denies any trauma or injury to the toes.  Noticed that sometimes she feels a little discomfort at the toenails.  \par \par No other complaints.

## 2021-05-18 NOTE — PHYSICAL EXAM
[No Acute Distress] : no acute distress [Normal Voice/Communication] : normal voice/communication [Pedal Pulses Present] : the pedal pulses are present [de-identified] : White toe nails noted on 2nd to 4th digit on left foot, right foot on 2nd and 3rd digits, no pain to palpation.  No other skin discoloration or lesions noted on feet.

## 2021-07-01 LAB
ALBUMIN SERPL ELPH-MCNC: 4.3 G/DL
ALP BLD-CCNC: 315 U/L
ALT SERPL-CCNC: 109 U/L
ANION GAP SERPL CALC-SCNC: 10 MMOL/L
AST SERPL-CCNC: 111 U/L
BASOPHILS # BLD AUTO: 0.05 K/UL
BASOPHILS NFR BLD AUTO: 0.8 %
BILIRUB SERPL-MCNC: 8.8 MG/DL
BUN SERPL-MCNC: 16 MG/DL
CALCIUM SERPL-MCNC: 9.3 MG/DL
CHLORIDE SERPL-SCNC: 97 MMOL/L
CO2 SERPL-SCNC: 25 MMOL/L
CREAT SERPL-MCNC: 0.69 MG/DL
EOSINOPHIL # BLD AUTO: 0.15 K/UL
EOSINOPHIL NFR BLD AUTO: 2.5 %
GLUCOSE SERPL-MCNC: 98 MG/DL
HCT VFR BLD CALC: 40.2 %
HGB BLD-MCNC: 13.3 G/DL
IMM GRANULOCYTES NFR BLD AUTO: 0.3 %
LYMPHOCYTES # BLD AUTO: 1.73 K/UL
LYMPHOCYTES NFR BLD AUTO: 29.3 %
MAN DIFF?: NORMAL
MCHC RBC-ENTMCNC: 31.7 PG
MCHC RBC-ENTMCNC: 33.1 GM/DL
MCV RBC AUTO: 95.7 FL
MONOCYTES # BLD AUTO: 0.67 K/UL
MONOCYTES NFR BLD AUTO: 11.4 %
NEUTROPHILS # BLD AUTO: 3.28 K/UL
NEUTROPHILS NFR BLD AUTO: 55.7 %
PLATELET # BLD AUTO: 141 K/UL
POTASSIUM SERPL-SCNC: 4.4 MMOL/L
PROT SERPL-MCNC: 6.7 G/DL
RBC # BLD: 4.2 M/UL
RBC # FLD: 14.1 %
SODIUM SERPL-SCNC: 133 MMOL/L
WBC # FLD AUTO: 5.9 K/UL

## 2021-07-02 ENCOUNTER — TRANSCRIPTION ENCOUNTER (OUTPATIENT)
Age: 66
End: 2021-07-02

## 2021-07-12 ENCOUNTER — RESULT REVIEW (OUTPATIENT)
Age: 66
End: 2021-07-12

## 2021-07-12 DIAGNOSIS — L29.9 PRURITUS, UNSPECIFIED: ICD-10-CM

## 2021-07-12 LAB
ALBUMIN SERPL ELPH-MCNC: 4.2 G/DL
ALP BLD-CCNC: 284 U/L
ALT SERPL-CCNC: 70 U/L
AST SERPL-CCNC: 84 U/L
BILIRUB DIRECT SERPL-MCNC: 4.5 MG/DL
BILIRUB INDIRECT SERPL-MCNC: 2.1 MG/DL
BILIRUB SERPL-MCNC: 6.6 MG/DL
PROT SERPL-MCNC: 6.8 G/DL

## 2021-07-15 ENCOUNTER — NON-APPOINTMENT (OUTPATIENT)
Age: 66
End: 2021-07-15

## 2021-07-19 ENCOUNTER — OUTPATIENT (OUTPATIENT)
Dept: OUTPATIENT SERVICES | Facility: HOSPITAL | Age: 66
LOS: 1 days | End: 2021-07-19
Payer: MEDICARE

## 2021-07-19 ENCOUNTER — APPOINTMENT (OUTPATIENT)
Dept: ULTRASOUND IMAGING | Facility: IMAGING CENTER | Age: 66
End: 2021-07-19
Payer: MEDICARE

## 2021-07-19 DIAGNOSIS — L29.9 PRURITUS, UNSPECIFIED: ICD-10-CM

## 2021-07-19 DIAGNOSIS — R74.8 ABNORMAL LEVELS OF OTHER SERUM ENZYMES: ICD-10-CM

## 2021-07-19 PROCEDURE — 76705 ECHO EXAM OF ABDOMEN: CPT | Mod: 26

## 2021-07-19 PROCEDURE — 76705 ECHO EXAM OF ABDOMEN: CPT

## 2021-08-03 ENCOUNTER — APPOINTMENT (OUTPATIENT)
Dept: INTERNAL MEDICINE | Facility: CLINIC | Age: 66
End: 2021-08-03
Payer: MEDICARE

## 2021-08-03 VITALS
OXYGEN SATURATION: 98 % | HEIGHT: 61 IN | SYSTOLIC BLOOD PRESSURE: 114 MMHG | WEIGHT: 102 LBS | TEMPERATURE: 98.5 F | BODY MASS INDEX: 19.26 KG/M2 | HEART RATE: 74 BPM | DIASTOLIC BLOOD PRESSURE: 68 MMHG

## 2021-08-03 PROCEDURE — 36415 COLL VENOUS BLD VENIPUNCTURE: CPT

## 2021-08-03 PROCEDURE — 99212 OFFICE O/P EST SF 10 MIN: CPT | Mod: 25

## 2021-08-03 RX ORDER — TERBINAFINE HYDROCHLORIDE 250 MG/1
250 TABLET ORAL DAILY
Qty: 84 | Refills: 0 | Status: COMPLETED | COMMUNITY
Start: 2021-05-18 | End: 2021-06-03

## 2021-08-03 NOTE — HISTORY OF PRESENT ILLNESS
[FreeTextEntry1] : follow up abnormal lfts [de-identified] : Patient is here for follow up of the abnormal lfts that occurred when she took terbinafine for her toenail fungus.  She reports that her appetite is completely back, the pruritus is gone and her urine is back to the normal color.  She also noticed that there was clearance of one of her toe nails so she wants to hold off on further treatment at this point.  \par \par She reports having her liver ultrasound done recently and is wondering about the result of test.  She has no other concerns.  She reports keeping herself masked in spite of her vaccination status due to the Delta variant.

## 2021-08-03 NOTE — PHYSICAL EXAM
[No Acute Distress] : no acute distress [Well Developed] : well developed [Normal Sclera/Conjunctiva] : normal sclera/conjunctiva [de-identified] : no jaundice [de-identified] : no jaundice

## 2021-08-10 LAB
ALBUMIN SERPL ELPH-MCNC: 4.2 G/DL
ALP BLD-CCNC: 353 U/L
ALT SERPL-CCNC: 95 U/L
ANION GAP SERPL CALC-SCNC: 12 MMOL/L
AST SERPL-CCNC: 111 U/L
BILIRUB SERPL-MCNC: 1.7 MG/DL
BUN SERPL-MCNC: 19 MG/DL
CALCIUM SERPL-MCNC: 10.1 MG/DL
CHLORIDE SERPL-SCNC: 98 MMOL/L
CO2 SERPL-SCNC: 28 MMOL/L
CREAT SERPL-MCNC: 0.69 MG/DL
GLUCOSE SERPL-MCNC: 201 MG/DL
POTASSIUM SERPL-SCNC: 4.7 MMOL/L
PROT SERPL-MCNC: 6.7 G/DL
SODIUM SERPL-SCNC: 138 MMOL/L

## 2021-09-02 ENCOUNTER — RX RENEWAL (OUTPATIENT)
Age: 66
End: 2021-09-02

## 2021-09-02 RX ORDER — CALCIUM CARBONATE/VITAMIN D3 600 MG-10
600-400 TABLET ORAL
Refills: 0 | Status: ACTIVE | COMMUNITY

## 2021-09-09 ENCOUNTER — APPOINTMENT (OUTPATIENT)
Dept: HEPATOLOGY | Facility: CLINIC | Age: 66
End: 2021-09-09
Payer: MEDICARE

## 2021-09-09 VITALS
SYSTOLIC BLOOD PRESSURE: 117 MMHG | HEART RATE: 87 BPM | BODY MASS INDEX: 19.26 KG/M2 | TEMPERATURE: 97.9 F | HEIGHT: 61 IN | RESPIRATION RATE: 16 BRPM | WEIGHT: 102 LBS | DIASTOLIC BLOOD PRESSURE: 79 MMHG

## 2021-09-09 PROCEDURE — 99203 OFFICE O/P NEW LOW 30 MIN: CPT

## 2021-09-10 LAB
A1AT SERPL-MCNC: 134 MG/DL
ALBUMIN SERPL ELPH-MCNC: 4.5 G/DL
ALP BLD-CCNC: 248 U/L
ALT SERPL-CCNC: 60 U/L
ANION GAP SERPL CALC-SCNC: 15 MMOL/L
AST SERPL-CCNC: 55 U/L
BASOPHILS # BLD AUTO: 0.05 K/UL
BASOPHILS NFR BLD AUTO: 0.5 %
BILIRUB SERPL-MCNC: 0.8 MG/DL
BUN SERPL-MCNC: 22 MG/DL
CALCIUM SERPL-MCNC: 10 MG/DL
CHLORIDE SERPL-SCNC: 101 MMOL/L
CO2 SERPL-SCNC: 27 MMOL/L
CREAT SERPL-MCNC: 0.73 MG/DL
EOSINOPHIL # BLD AUTO: 0.06 K/UL
EOSINOPHIL NFR BLD AUTO: 0.7 %
FERRITIN SERPL-MCNC: 164 NG/ML
GLUCOSE SERPL-MCNC: 113 MG/DL
HAV IGM SER QL: NONREACTIVE
HBV CORE IGM SER QL: NONREACTIVE
HBV SURFACE AB SER QL: NONREACTIVE
HCT VFR BLD CALC: 40.2 %
HCV AB SER QL: NONREACTIVE
HCV S/CO RATIO: 0.15 S/CO
HGB BLD-MCNC: 13.5 G/DL
IGG SER QL IEP: 971 MG/DL
IMM GRANULOCYTES NFR BLD AUTO: 0.1 %
INR PPP: 0.95 RATIO
IRON SATN MFR SERPL: 11 %
IRON SERPL-MCNC: 37 UG/DL
LYMPHOCYTES # BLD AUTO: 1.91 K/UL
LYMPHOCYTES NFR BLD AUTO: 20.7 %
MAN DIFF?: NORMAL
MCHC RBC-ENTMCNC: 32 PG
MCHC RBC-ENTMCNC: 33.6 GM/DL
MCV RBC AUTO: 95.3 FL
MONOCYTES # BLD AUTO: 0.77 K/UL
MONOCYTES NFR BLD AUTO: 8.4 %
NEUTROPHILS # BLD AUTO: 6.41 K/UL
NEUTROPHILS NFR BLD AUTO: 69.6 %
PLATELET # BLD AUTO: 132 K/UL
POTASSIUM SERPL-SCNC: 4.3 MMOL/L
PROT SERPL-MCNC: 7.3 G/DL
PT BLD: 11.2 SEC
RBC # BLD: 4.22 M/UL
RBC # FLD: 12.8 %
SODIUM SERPL-SCNC: 143 MMOL/L
TIBC SERPL-MCNC: 330 UG/DL
UIBC SERPL-MCNC: 293 UG/DL
WBC # FLD AUTO: 9.21 K/UL

## 2021-09-10 NOTE — REVIEW OF SYSTEMS
[Negative] : Neurological [Fever] : no fever [Chills] : no chills [Chest Pain] : no chest pain [Palpitations] : no palpitations [Shortness Of Breath] : no shortness of breath [Wheezing] : no wheezing [Cough] : no cough [Abdominal Pain] : no abdominal pain [Vomiting] : no vomiting [Melena] : no melena [Dysuria] : no dysuria [Itching] : no itching [Confused] : no confusion [Dizziness] : no dizziness [Fainting] : no fainting

## 2021-09-10 NOTE — PHYSICAL EXAM
[General Appearance - Alert] : alert [General Appearance - In No Acute Distress] : in no acute distress [General Appearance - Well Nourished] : well nourished [Sclera] : the sclera and conjunctiva were normal [Outer Ear] : the ears and nose were normal in appearance [Neck Appearance] : the appearance of the neck was normal [Jugular Venous Distention Increased] : there was no jugular-venous distention [Respiration, Rhythm And Depth] : normal respiratory rhythm and effort [Exaggerated Use Of Accessory Muscles For Inspiration] : no accessory muscle use [Heart Rate And Rhythm] : heart rate was normal and rhythm regular [Heart Sounds] : normal S1 and S2 [Murmurs] : no murmurs [Heart Sounds Pericardial Friction Rub] : no pericardial rub [Edema] : there was no peripheral edema [Bowel Sounds] : normal bowel sounds [Abdomen Soft] : soft [Abdomen Tenderness] : non-tender [No CVA Tenderness] : no ~M costovertebral angle tenderness [Skin Color & Pigmentation] : normal skin color and pigmentation [] : no rash [Oriented To Time, Place, And Person] : oriented to person, place, and time [Impaired Insight] : insight and judgment were intact [Affect] : the affect was normal [Scleral Icterus] : No Scleral Icterus [Spider Angioma] : No spider angioma(s) were observed [Abdominal Bruit] : no abdominal bruit [Abdominal  Ascites] : no ascites [Asterixis] : no asterixis observed [Jaundice] : No jaundice

## 2021-09-10 NOTE — HISTORY OF PRESENT ILLNESS
[de-identified] : Ms. Vieira is a 65 year old female with hx of HLD and basal cell carcinoma. In 05/2021 she went to [facility] for toenail fungus and was rx Terbinafine, she used medication until end of June when she became jaundice and starting feeling unwell (didn’t require hospitalization). BW revealed elevations in LFT's, bili 8.8, , , . Presents today after being off terbinafine for over 2 months with consistent elevated LFT's, bili 1.7, , ALT 95, . Denies alcohol, smoking or illicit drug usage. No family hx of liver dz. Denies chest pain/palpitations, SOB, ab pain, n/v, melena/BRBPR, and jaundice. \par \par Social hx: She is a retired ,  with 2 adult children.\par OTC/remedies: none\par NSAIDS: none\par \par Work up:\par -06/30/2021: BW-- bili 8.8, , , \par -07/2021: BW--bili 6.6, AST 84, ALT 70, . US ab --Liver WNL\par -08/03/2021: BW-bili 1.7, , ALT 95, \par \par ROS as below\par

## 2021-09-10 NOTE — ASSESSMENT
[FreeTextEntry1] : Ms. Vieira is a 65 year old female with hx of HLD and basal cell carcinoma. [facility] for toenail fungus and was rx Terbinafine, she used medication until end of June when she became jaundice and starting feeling unwell (didn’t require hospitalization). BW revealed elevations in LFT's, bili 8.8, , , . Presents today after being off terbinafine for over 2 months with consistent elevated LFT's.\par \par 1. Suspected DILI\par -pt no longer on presumed offending medication, denies s+s of decompensated liver disease. None noted on exam\par -BMI 19, with lean body mass. Does have hx of HLD on statin, no family hx of liver disease. Ordered Fibro min risk of fatty liver. No risky alcohol behavior of note. \par -Latest BW revealed elevated LFT's, will repeat BW today to trend. \par -  I have encouraged a healthy lifestyle including exercising at least 3x times weekly and maintaining a diet low in carbohydrates, fat, sodium (<2gm daily) and cholesterol. \par -I have counseled pt on abstaining from alcohol and illicit drug use, avoid herbal and dietary supplements. Encouraged limit use of acetaminophen to <2gm per day and to avoid NSAIDS.    \par \par 2. HM\par -Covid vaccination completed via Pfizer 02/2021\par -COL completed in 2013, as per pt normal with repeat in 2023. FBOT completed 2019 by PCP and was negative\par -HAV/HBV will check status\par \par Plan:\par RTC 1 month with Fibroscan\par Repeat BW today, if trending down will repeat again in 2 weeks. \par Pt to remain off Terbinafine and advised not to use in the future.

## 2021-09-13 LAB
ANA SER IF-ACNC: NEGATIVE
MITOCHONDRIA AB SER IF-ACNC: NORMAL
SMOOTH MUSCLE AB SER QL IF: NORMAL

## 2021-09-24 LAB
ALBUMIN SERPL ELPH-MCNC: 4.6 G/DL
ALP BLD-CCNC: 234 U/L
ALT SERPL-CCNC: 67 U/L
ANION GAP SERPL CALC-SCNC: 12 MMOL/L
AST SERPL-CCNC: 137 U/L
BASOPHILS # BLD AUTO: 0.05 K/UL
BASOPHILS NFR BLD AUTO: 0.8 %
BILIRUB SERPL-MCNC: 0.9 MG/DL
BUN SERPL-MCNC: 17 MG/DL
CALCIUM SERPL-MCNC: 9.8 MG/DL
CHLORIDE SERPL-SCNC: 101 MMOL/L
CO2 SERPL-SCNC: 26 MMOL/L
CREAT SERPL-MCNC: 0.76 MG/DL
EOSINOPHIL # BLD AUTO: 0.12 K/UL
EOSINOPHIL NFR BLD AUTO: 1.9 %
GLUCOSE SERPL-MCNC: 88 MG/DL
HCT VFR BLD CALC: 39.5 %
HGB BLD-MCNC: 13.2 G/DL
IMM GRANULOCYTES NFR BLD AUTO: 0.2 %
LYMPHOCYTES # BLD AUTO: 2.36 K/UL
LYMPHOCYTES NFR BLD AUTO: 36.4 %
MAN DIFF?: NORMAL
MCHC RBC-ENTMCNC: 31.4 PG
MCHC RBC-ENTMCNC: 33.4 GM/DL
MCV RBC AUTO: 93.8 FL
MONOCYTES # BLD AUTO: 0.47 K/UL
MONOCYTES NFR BLD AUTO: 7.3 %
NEUTROPHILS # BLD AUTO: 3.47 K/UL
NEUTROPHILS NFR BLD AUTO: 53.4 %
PLATELET # BLD AUTO: 167 K/UL
POTASSIUM SERPL-SCNC: 4.4 MMOL/L
PROT SERPL-MCNC: 6.9 G/DL
RBC # BLD: 4.21 M/UL
RBC # FLD: 12.2 %
SODIUM SERPL-SCNC: 140 MMOL/L
WBC # FLD AUTO: 6.48 K/UL

## 2021-09-27 ENCOUNTER — TRANSCRIPTION ENCOUNTER (OUTPATIENT)
Age: 66
End: 2021-09-27

## 2021-09-27 LAB
CHOLEST SERPL-MCNC: 206 MG/DL
GGT SERPL-CCNC: 758 U/L
HDLC SERPL-MCNC: 92 MG/DL
LDLC SERPL CALC-MCNC: 103 MG/DL
NONHDLC SERPL-MCNC: 114 MG/DL
TRIGL SERPL-MCNC: 58 MG/DL

## 2021-09-28 ENCOUNTER — TRANSCRIPTION ENCOUNTER (OUTPATIENT)
Age: 66
End: 2021-09-28

## 2021-10-04 LAB — PHOSPHATIDYETHANOL (PETH), WHOLE BLOOD: NEGATIVE NG/ML

## 2021-10-12 LAB
ALBUMIN SERPL ELPH-MCNC: 4.5 G/DL
ALBUMIN SERPL ELPH-MCNC: 4.5 G/DL
ALP BLD-CCNC: 201 U/L
ALP BLD-CCNC: 209 U/L
ALT SERPL-CCNC: 41 U/L
ALT SERPL-CCNC: 43 U/L
ANION GAP SERPL CALC-SCNC: 11 MMOL/L
ANION GAP SERPL CALC-SCNC: 12 MMOL/L
AST SERPL-CCNC: 46 U/L
AST SERPL-CCNC: 47 U/L
BASOPHILS # BLD AUTO: 0.04 K/UL
BASOPHILS NFR BLD AUTO: 0.6 %
BILIRUB SERPL-MCNC: 1.1 MG/DL
BILIRUB SERPL-MCNC: 1.1 MG/DL
BUN SERPL-MCNC: 13 MG/DL
BUN SERPL-MCNC: 13 MG/DL
CALCIUM SERPL-MCNC: 10 MG/DL
CALCIUM SERPL-MCNC: 10.2 MG/DL
CHLORIDE SERPL-SCNC: 100 MMOL/L
CHLORIDE SERPL-SCNC: 101 MMOL/L
CHOLEST SERPL-MCNC: 255 MG/DL
CO2 SERPL-SCNC: 29 MMOL/L
CO2 SERPL-SCNC: 30 MMOL/L
CREAT SERPL-MCNC: 0.73 MG/DL
CREAT SERPL-MCNC: 0.73 MG/DL
EOSINOPHIL # BLD AUTO: 0.1 K/UL
EOSINOPHIL NFR BLD AUTO: 1.6 %
ESTIMATED AVERAGE GLUCOSE: 105 MG/DL
GLUCOSE SERPL-MCNC: 83 MG/DL
GLUCOSE SERPL-MCNC: 86 MG/DL
HBA1C MFR BLD HPLC: 5.3 %
HCT VFR BLD CALC: 41 %
HDLC SERPL-MCNC: 89 MG/DL
HGB BLD-MCNC: 14.1 G/DL
IMM GRANULOCYTES NFR BLD AUTO: 0 %
LDLC SERPL CALC-MCNC: 144 MG/DL
LYMPHOCYTES # BLD AUTO: 2.24 K/UL
LYMPHOCYTES NFR BLD AUTO: 35.7 %
MAN DIFF?: NORMAL
MCHC RBC-ENTMCNC: 31.8 PG
MCHC RBC-ENTMCNC: 34.4 GM/DL
MCV RBC AUTO: 92.6 FL
MONOCYTES # BLD AUTO: 0.46 K/UL
MONOCYTES NFR BLD AUTO: 7.3 %
NEUTROPHILS # BLD AUTO: 3.43 K/UL
NEUTROPHILS NFR BLD AUTO: 54.8 %
NONHDLC SERPL-MCNC: 165 MG/DL
PLATELET # BLD AUTO: 130 K/UL
POTASSIUM SERPL-SCNC: 4.1 MMOL/L
POTASSIUM SERPL-SCNC: 4.2 MMOL/L
PROT SERPL-MCNC: 7 G/DL
PROT SERPL-MCNC: 7.1 G/DL
RBC # BLD: 4.43 M/UL
RBC # FLD: 12 %
SODIUM SERPL-SCNC: 141 MMOL/L
SODIUM SERPL-SCNC: 142 MMOL/L
TRIGL SERPL-MCNC: 105 MG/DL
WBC # FLD AUTO: 6.27 K/UL

## 2021-10-13 ENCOUNTER — APPOINTMENT (OUTPATIENT)
Dept: HEPATOLOGY | Facility: CLINIC | Age: 66
End: 2021-10-13
Payer: MEDICARE

## 2021-10-13 VITALS
HEIGHT: 61 IN | TEMPERATURE: 97.2 F | DIASTOLIC BLOOD PRESSURE: 89 MMHG | BODY MASS INDEX: 18.88 KG/M2 | HEART RATE: 79 BPM | RESPIRATION RATE: 16 BRPM | WEIGHT: 100 LBS | SYSTOLIC BLOOD PRESSURE: 144 MMHG

## 2021-10-13 PROCEDURE — 99213 OFFICE O/P EST LOW 20 MIN: CPT

## 2021-10-13 PROCEDURE — 91200 LIVER ELASTOGRAPHY: CPT

## 2021-10-14 ENCOUNTER — TRANSCRIPTION ENCOUNTER (OUTPATIENT)
Age: 66
End: 2021-10-14

## 2021-10-14 NOTE — HISTORY OF PRESENT ILLNESS
[de-identified] : Ms. Vieira is a 65 year old female with hx of HLD and basal cell carcinoma. In 05/2021 she went to [facility] for toenail fungus and was rx Terbinafine, she used medication until end of June when she became jaundice and starting feeling unwell (didn’t require hospitalization). BW revealed elevations in LFT's, bili 8.8, , , . Presents today for fibroscan and f/u, BW from 10/13/21 shows improving LFT's: bili 1.1, AST 46, ALT 43, . Of note pt PCP discontinued statin 2 weeks ago with a decrease in LFT's. Denies chest pain/palpitations, SOB, ab pain, n/v, melena/BRBPR, and jaundice. \par \par Family hx: no liver disease\par Social hx: She is a retired ,  with 2 adult children. Denies smoking, illicit drug use or alcohol use.\par OTC/remedies: none\par NSAIDS: none\par \par Work up:\par -06/30/2021: BW-- bili 8.8, , , \par -07/2021: BW--bili 6.6, AST 84, ALT 70, . US ab --Liver WNL\par -08/03/2021: BW-bili 1.7, , ALT 95, \par -09/24/2021: BW--0.9, , ALT 67, \par -10/12/2021: BW--bili 1.1, AST 46, ALT 43, \par -10/13/2021: Fibroscan -- (S0) and E 3.4 E kPa (F0)\par \par ROS as below\par

## 2021-10-14 NOTE — PHYSICAL EXAM
[General Appearance - Alert] : alert [General Appearance - In No Acute Distress] : in no acute distress [General Appearance - Well Nourished] : well nourished [Sclera] : the sclera and conjunctiva were normal [Outer Ear] : the ears and nose were normal in appearance [Neck Appearance] : the appearance of the neck was normal [Jugular Venous Distention Increased] : there was no jugular-venous distention [] : no respiratory distress [Heart Rate And Rhythm] : heart rate was normal and rhythm regular [Edema] : there was no peripheral edema [Skin Color & Pigmentation] : normal skin color and pigmentation [Oriented To Time, Place, And Person] : oriented to person, place, and time [Impaired Insight] : insight and judgment were intact [Affect] : the affect was normal [Abnormal Walk] : normal gait [Scleral Icterus] : No Scleral Icterus [Jaundice] : No jaundice

## 2021-10-14 NOTE — ASSESSMENT
[FreeTextEntry1] : Ms. Vieira is a 65 year old female with hx of HLD and basal cell carcinoma. In 05/2021 she went to [facility] for toenail fungus and was rx Terbinafine, she used medication until end of June when she became jaundice and starting feeling unwell (didn’t require hospitalization). BW revealed elevations in LFT's, bili 8.8, , , . Presents today for fibroscan and f/u, BW from 10/13/21 shows improving LFT's: bili 1.1, AST 46, ALT 43, . Of note pt PCP discontinued statin 2 weeks ago with a decrease in LFT's.\par \par 1. Suspected DILI\par -pt no longer on presumed offending medication and trial off statin for 2 weeks with decreasing LFT's. Pt will f/u with PCP as to the need to restart statin as lipid panel worsened since off medication. Advised to have PCP f/u with BW 2 weeks if restarting. \par -Ordered BW for 1 mn to trend LFT's.\par - denies s+s of decompensated liver disease. None noted on exam\par -BMI 19, with lean body mass. Does have hx of HLD on statin, Fibro revealed S0 and F0\par -  I have encouraged a healthy lifestyle including exercising at least 3x times weekly and maintaining a diet low in carbohydrates, fat, sodium (<2gm daily) and cholesterol. \par -I have counseled pt on abstaining from alcohol and illicit drug use, avoid herbal and dietary supplements. Encouraged limit use of acetaminophen to <2gm per day and to avoid NSAIDS.    \par \par 2. HM\par -Covid vaccination completed via Pfizer 02/2021\par -COL completed in 2013, as per pt normal with repeat in 2023. FBOT completed 2019 by PCP and was negative\par -HAV/HBV-not immune pt to decide on starting vaccination series \par \par Plan:\par RTC 3mns \par Repeat BW in 1 month to trend LFT's, if cleared RTC PRN according to BW results\par If PCP restarting statin, PCP to f/u with repeat BW and pt will alert office.

## 2021-10-15 ENCOUNTER — NON-APPOINTMENT (OUTPATIENT)
Age: 66
End: 2021-10-15

## 2021-10-15 LAB
ALP BLD-CCNC: 203 IU/L
ALP BONE CFR SERPL: 17 %
ALP INTEST CFR SERPL: 3 %
ALP LIVER CFR SERPL: 80 %

## 2021-10-22 LAB — HEPATITIS A IGG ANTIBODY: NONREACTIVE

## 2021-10-25 ENCOUNTER — TRANSCRIPTION ENCOUNTER (OUTPATIENT)
Age: 66
End: 2021-10-25

## 2021-11-12 LAB
ALBUMIN SERPL ELPH-MCNC: 4.5 G/DL
ALP BLD-CCNC: 163 U/L
ALT SERPL-CCNC: 38 U/L
AST SERPL-CCNC: 59 U/L
BILIRUB DIRECT SERPL-MCNC: 0.2 MG/DL
BILIRUB INDIRECT SERPL-MCNC: 0.6 MG/DL
BILIRUB SERPL-MCNC: 0.8 MG/DL
CHOLEST SERPL-MCNC: 216 MG/DL
HDLC SERPL-MCNC: 89 MG/DL
LDLC SERPL CALC-MCNC: 110 MG/DL
NONHDLC SERPL-MCNC: 127 MG/DL
PROT SERPL-MCNC: 6.9 G/DL
TRIGL SERPL-MCNC: 83 MG/DL

## 2021-11-30 ENCOUNTER — TRANSCRIPTION ENCOUNTER (OUTPATIENT)
Age: 66
End: 2021-11-30

## 2021-12-01 LAB
ALBUMIN SERPL ELPH-MCNC: 4.4 G/DL
ALP BLD-CCNC: 115 U/L
ALT SERPL-CCNC: 23 U/L
ANION GAP SERPL CALC-SCNC: 12 MMOL/L
AST SERPL-CCNC: 33 U/L
BILIRUB SERPL-MCNC: 0.7 MG/DL
BUN SERPL-MCNC: 20 MG/DL
CALCIUM SERPL-MCNC: 9.8 MG/DL
CHLORIDE SERPL-SCNC: 101 MMOL/L
CO2 SERPL-SCNC: 27 MMOL/L
CREAT SERPL-MCNC: 0.73 MG/DL
GGT SERPL-CCNC: 397 U/L
GLUCOSE SERPL-MCNC: 85 MG/DL
POTASSIUM SERPL-SCNC: 4.7 MMOL/L
PROT SERPL-MCNC: 6.8 G/DL
SODIUM SERPL-SCNC: 140 MMOL/L

## 2021-12-02 LAB
ALBUMIN SERPL ELPH-MCNC: 4.6 G/DL
ALP BLD-CCNC: 116 U/L
ALT SERPL-CCNC: 24 U/L
AST SERPL-CCNC: 34 U/L
BILIRUB DIRECT SERPL-MCNC: 0.2 MG/DL
BILIRUB INDIRECT SERPL-MCNC: 0.5 MG/DL
BILIRUB SERPL-MCNC: 0.8 MG/DL
PROT SERPL-MCNC: 7 G/DL

## 2021-12-06 LAB
ALP BLD-CCNC: 111 IU/L
ALP BONE CFR SERPL: 17 %
ALP INTEST CFR SERPL: 3 %
ALP LIVER CFR SERPL: 80 %

## 2022-01-14 ENCOUNTER — APPOINTMENT (OUTPATIENT)
Dept: INTERNAL MEDICINE | Facility: CLINIC | Age: 67
End: 2022-01-14
Payer: MEDICARE

## 2022-01-14 VITALS
SYSTOLIC BLOOD PRESSURE: 138 MMHG | BODY MASS INDEX: 19.27 KG/M2 | TEMPERATURE: 97.9 F | DIASTOLIC BLOOD PRESSURE: 80 MMHG | WEIGHT: 102 LBS | OXYGEN SATURATION: 98 % | HEART RATE: 64 BPM

## 2022-01-14 DIAGNOSIS — Z80.42 FAMILY HISTORY OF MALIGNANT NEOPLASM OF PROSTATE: ICD-10-CM

## 2022-01-14 DIAGNOSIS — Z82.49 FAMILY HISTORY OF ISCHEMIC HEART DISEASE AND OTHER DISEASES OF THE CIRCULATORY SYSTEM: ICD-10-CM

## 2022-01-14 DIAGNOSIS — Z83.79 FAMILY HISTORY OF OTHER DISEASES OF THE DIGESTIVE SYSTEM: ICD-10-CM

## 2022-01-14 DIAGNOSIS — B35.1 TINEA UNGUIUM: ICD-10-CM

## 2022-01-14 DIAGNOSIS — Z00.00 ENCOUNTER FOR GENERAL ADULT MEDICAL EXAMINATION W/OUT ABNORMAL FINDINGS: ICD-10-CM

## 2022-01-14 PROCEDURE — G0439: CPT

## 2022-01-14 NOTE — PHYSICAL EXAM
[Normal Sclera/Conjunctiva] : normal sclera/conjunctiva [Normal TMs] : both tympanic membranes were normal [No Lymphadenopathy] : no lymphadenopathy [Normal] : affect was normal and insight and judgment were intact

## 2022-01-14 NOTE — HEALTH RISK ASSESSMENT
[None] : None [With Significant Other] : lives with significant other [# of Members in Household ___] :  household currently consist of [unfilled] member(s) [Retired] : retired [] :  [Feels Safe at Home] : Feels safe at home [Fully functional (bathing, dressing, toileting, transferring, walking, feeding)] : Fully functional (bathing, dressing, toileting, transferring, walking, feeding) [Fully functional (using the telephone, shopping, preparing meals, housekeeping, doing laundry, using] : Fully functional and needs no help or supervision to perform IADLs (using the telephone, shopping, preparing meals, housekeeping, doing laundry, using transportation, managing medications and managing finances) [Reports changes in hearing] : Reports no changes in hearing [Reports changes in vision] : Reports no changes in vision [Smoke Detector] : smoke detector [Carbon Monoxide Detector] : carbon monoxide detector [Seat Belt] :  uses seat belt

## 2022-01-14 NOTE — HISTORY OF PRESENT ILLNESS
[FreeTextEntry1] : annual check up  [de-identified] : 65 yo female with h/o basal cell carcinoma, presents for her annual wellness visit without any concerns.  She is doing well overall, would like to see her Ob/gyne for annual check up, reporting last pap smear was about 2 years ago and was normal.

## 2022-01-14 NOTE — REVIEW OF SYSTEMS
[Negative] : Heme/Lymph [FreeTextEntry7] : d [FreeTextEntry8] : Wants to see Gyne for routine care [de-identified] : wants to see Derm for follow up

## 2022-02-17 ENCOUNTER — NON-APPOINTMENT (OUTPATIENT)
Age: 67
End: 2022-02-17

## 2022-02-24 ENCOUNTER — NON-APPOINTMENT (OUTPATIENT)
Age: 67
End: 2022-02-24

## 2022-02-24 DIAGNOSIS — Z98.890 OTHER SPECIFIED POSTPROCEDURAL STATES: ICD-10-CM

## 2022-02-24 DIAGNOSIS — Z92.89 PERSONAL HISTORY OF OTHER MEDICAL TREATMENT: ICD-10-CM

## 2022-02-24 DIAGNOSIS — Z78.9 OTHER SPECIFIED HEALTH STATUS: ICD-10-CM

## 2022-03-03 ENCOUNTER — APPOINTMENT (OUTPATIENT)
Dept: RADIOLOGY | Facility: IMAGING CENTER | Age: 67
End: 2022-03-03
Payer: MEDICARE

## 2022-03-03 ENCOUNTER — OUTPATIENT (OUTPATIENT)
Dept: OUTPATIENT SERVICES | Facility: HOSPITAL | Age: 67
LOS: 1 days | End: 2022-03-03
Payer: MEDICARE

## 2022-03-03 DIAGNOSIS — M81.0 AGE-RELATED OSTEOPOROSIS WITHOUT CURRENT PATHOLOGICAL FRACTURE: ICD-10-CM

## 2022-03-03 PROCEDURE — 77080 DXA BONE DENSITY AXIAL: CPT

## 2022-03-03 PROCEDURE — 77080 DXA BONE DENSITY AXIAL: CPT | Mod: 26

## 2022-03-21 ENCOUNTER — APPOINTMENT (OUTPATIENT)
Dept: INTERNAL MEDICINE | Facility: CLINIC | Age: 67
End: 2022-03-21

## 2022-03-23 ENCOUNTER — APPOINTMENT (OUTPATIENT)
Dept: GASTROENTEROLOGY | Facility: CLINIC | Age: 67
End: 2022-03-23
Payer: MEDICARE

## 2022-03-23 VITALS
WEIGHT: 105 LBS | BODY MASS INDEX: 19.83 KG/M2 | SYSTOLIC BLOOD PRESSURE: 135 MMHG | DIASTOLIC BLOOD PRESSURE: 82 MMHG | HEIGHT: 61 IN | HEART RATE: 66 BPM

## 2022-03-23 PROCEDURE — 99203 OFFICE O/P NEW LOW 30 MIN: CPT

## 2022-03-23 NOTE — HISTORY OF PRESENT ILLNESS
[Heartburn] : denies heartburn [Nausea] : denies nausea [Vomiting] : denies vomiting [Diarrhea] : denies diarrhea [Constipation] : denies constipation [Yellow Skin Or Eyes (Jaundice)] : denies jaundice [Abdominal Pain] : denies abdominal pain [Abdominal Swelling] : denies abdominal swelling [Rectal Pain] : denies rectal pain [de-identified] : Sindy presents to the office today for evaluation for colon cancer screening.  \par \par She feels well from a GI perspective, and denies any dysphagia, nausea, abdominal pain, change in bowel habits, GI bleeding, weight loss, or family history of colon cancer.  She normally moves her bowels once a day.  She had a prior colonoscopy 10 years ago which was reportedly normal.

## 2022-03-23 NOTE — ASSESSMENT
[FreeTextEntry1] : 1.  Encounter for colon cancer screening in average risk patient.  Previous colonoscopy 10 years ago.\par 2.  HLD.\par 3.  Osteoporosis.\par 4.  History of DILI.\par \par Recs:\par - Recent labs reviewed.\par - Patient was advised to undergo colonoscopy- procedure, risks, benefits, and alternatives were explained. Patient agreeable. Brochure given. Clenpiq (vs magnesium citrate).\par

## 2022-04-29 ENCOUNTER — APPOINTMENT (OUTPATIENT)
Dept: GASTROENTEROLOGY | Facility: CLINIC | Age: 67
End: 2022-04-29
Payer: MEDICARE

## 2022-04-29 PROCEDURE — 45378 DIAGNOSTIC COLONOSCOPY: CPT

## 2022-05-02 ENCOUNTER — APPOINTMENT (OUTPATIENT)
Dept: OBGYN | Facility: CLINIC | Age: 67
End: 2022-05-02
Payer: MEDICARE

## 2022-05-02 VITALS
WEIGHT: 105 LBS | DIASTOLIC BLOOD PRESSURE: 80 MMHG | SYSTOLIC BLOOD PRESSURE: 138 MMHG | HEIGHT: 61 IN | BODY MASS INDEX: 19.83 KG/M2

## 2022-05-02 DIAGNOSIS — Z01.419 ENCOUNTER FOR GYNECOLOGICAL EXAMINATION (GENERAL) (ROUTINE) W/OUT ABNORMAL FINDINGS: ICD-10-CM

## 2022-05-02 PROCEDURE — G0101: CPT

## 2022-05-02 NOTE — PLAN
[FreeTextEntry1] : 67 y/o female, annual exam\par \par -pap done today\par -rx mammo given\par -colon up to date\par -dexa up to date\par -rto 2 years

## 2022-05-02 NOTE — HISTORY OF PRESENT ILLNESS
[Patient reported PAP Smear was normal] : Patient reported PAP Smear was normal [FreeTextEntry1] : 65 y/o female presents today for annual gyn exam. She offers no complaints. \par \par ObHx:  x2; MAB x2 [Mammogramdate] : 12/2018 [TextBox_19] : BIRADS 1 [PapSmeardate] : 7/2019 [BoneDensityDate] : 1/2022 [TextBox_37] : osteoporosis  [ColonoscopyDate] : 4/2022

## 2022-05-02 NOTE — END OF VISIT
[FreeTextEntry3] : I, Karol Pozo, acted as a scribe on behalf for Dr. Anna Duran on 05/02/2022.\par \par All medical entries made by the scribe were at my, Dr. Anna Duran, direction and personally dictated by me on 05/02/2022. I have reviewed the chart and agree that the record accurately reflects my personal performance of the history, physical exam, assessment, and plan. I have personally directed, reviewed, and agreed with the chart.

## 2022-05-06 ENCOUNTER — TRANSCRIPTION ENCOUNTER (OUTPATIENT)
Age: 67
End: 2022-05-06

## 2022-05-06 LAB
CYTOLOGY CVX/VAG DOC THIN PREP: ABNORMAL
HPV HIGH+LOW RISK DNA PNL CVX: NOT DETECTED

## 2022-05-17 LAB — SARS-COV-2 N GENE NPH QL NAA+PROBE: NOT DETECTED

## 2022-05-19 ENCOUNTER — RESULT REVIEW (OUTPATIENT)
Age: 67
End: 2022-05-19

## 2022-05-19 ENCOUNTER — OUTPATIENT (OUTPATIENT)
Dept: OUTPATIENT SERVICES | Facility: HOSPITAL | Age: 67
LOS: 1 days | End: 2022-05-19
Payer: MEDICARE

## 2022-05-19 ENCOUNTER — APPOINTMENT (OUTPATIENT)
Dept: MAMMOGRAPHY | Facility: IMAGING CENTER | Age: 67
End: 2022-05-19
Payer: MEDICARE

## 2022-05-19 DIAGNOSIS — Z00.8 ENCOUNTER FOR OTHER GENERAL EXAMINATION: ICD-10-CM

## 2022-05-19 PROCEDURE — 77067 SCR MAMMO BI INCL CAD: CPT

## 2022-05-19 PROCEDURE — 77063 BREAST TOMOSYNTHESIS BI: CPT

## 2022-05-19 PROCEDURE — 77067 SCR MAMMO BI INCL CAD: CPT | Mod: 26

## 2022-05-19 PROCEDURE — 77063 BREAST TOMOSYNTHESIS BI: CPT | Mod: 26

## 2022-06-27 ENCOUNTER — NON-APPOINTMENT (OUTPATIENT)
Age: 67
End: 2022-06-27

## 2022-06-27 DIAGNOSIS — Z78.9 OTHER SPECIFIED HEALTH STATUS: ICD-10-CM

## 2022-08-08 ENCOUNTER — RX RENEWAL (OUTPATIENT)
Age: 67
End: 2022-08-08

## 2022-08-10 ENCOUNTER — NON-APPOINTMENT (OUTPATIENT)
Age: 67
End: 2022-08-10

## 2022-08-22 ENCOUNTER — RX RENEWAL (OUTPATIENT)
Age: 67
End: 2022-08-22

## 2022-10-13 ENCOUNTER — APPOINTMENT (OUTPATIENT)
Dept: DERMATOLOGY | Facility: CLINIC | Age: 67
End: 2022-10-13

## 2022-10-13 ENCOUNTER — NON-APPOINTMENT (OUTPATIENT)
Age: 67
End: 2022-10-13

## 2022-10-13 VITALS — BODY MASS INDEX: 20.77 KG/M2 | HEIGHT: 61 IN | WEIGHT: 110 LBS

## 2022-10-13 DIAGNOSIS — Z12.83 ENCOUNTER FOR SCREENING FOR MALIGNANT NEOPLASM OF SKIN: ICD-10-CM

## 2022-10-13 DIAGNOSIS — L81.4 OTHER MELANIN HYPERPIGMENTATION: ICD-10-CM

## 2022-10-13 PROCEDURE — 99213 OFFICE O/P EST LOW 20 MIN: CPT

## 2022-10-17 ENCOUNTER — APPOINTMENT (OUTPATIENT)
Dept: HEPATOLOGY | Facility: CLINIC | Age: 67
End: 2022-10-17

## 2023-01-31 ENCOUNTER — APPOINTMENT (OUTPATIENT)
Dept: INTERNAL MEDICINE | Facility: CLINIC | Age: 68
End: 2023-01-31
Payer: MEDICARE

## 2023-01-31 VITALS
DIASTOLIC BLOOD PRESSURE: 82 MMHG | HEART RATE: 64 BPM | TEMPERATURE: 97.5 F | OXYGEN SATURATION: 97 % | SYSTOLIC BLOOD PRESSURE: 151 MMHG | WEIGHT: 104 LBS | HEIGHT: 61 IN | BODY MASS INDEX: 19.63 KG/M2

## 2023-01-31 DIAGNOSIS — R19.5 OTHER FECAL ABNORMALITIES: ICD-10-CM

## 2023-01-31 DIAGNOSIS — L98.9 DISORDER OF THE SKIN AND SUBCUTANEOUS TISSUE, UNSPECIFIED: ICD-10-CM

## 2023-01-31 DIAGNOSIS — Z87.59 PERSONAL HISTORY OF OTHER COMPLICATIONS OF PREGNANCY, CHILDBIRTH AND THE PUERPERIUM: ICD-10-CM

## 2023-01-31 DIAGNOSIS — K64.8 OTHER HEMORRHOIDS: ICD-10-CM

## 2023-01-31 DIAGNOSIS — H26.9 UNSPECIFIED CATARACT: ICD-10-CM

## 2023-01-31 DIAGNOSIS — Z71.89 OTHER SPECIFIED COUNSELING: ICD-10-CM

## 2023-01-31 DIAGNOSIS — Z12.11 ENCOUNTER FOR SCREENING FOR MALIGNANT NEOPLASM OF COLON: ICD-10-CM

## 2023-01-31 PROCEDURE — G0439: CPT

## 2023-01-31 RX ORDER — SIMVASTATIN 80 MG/1
TABLET, FILM COATED ORAL
Refills: 0 | Status: COMPLETED | COMMUNITY
End: 2022-01-01

## 2023-01-31 RX ORDER — SODIUM PICOSULFATE, MAGNESIUM OXIDE, AND ANHYDROUS CITRIC ACID 10; 3.5; 12 MG/160ML; G/160ML; G/160ML
10-3.5-12 MG-GM LIQUID ORAL
Qty: 1 | Refills: 0 | Status: COMPLETED | COMMUNITY
Start: 2022-03-23 | End: 2022-04-01

## 2023-02-03 VITALS — SYSTOLIC BLOOD PRESSURE: 130 MMHG | DIASTOLIC BLOOD PRESSURE: 80 MMHG

## 2023-02-03 PROBLEM — Z71.89 ADVANCED CARE PLANNING/COUNSELING DISCUSSION: Status: ACTIVE | Noted: 2023-02-03

## 2023-02-03 PROBLEM — L98.9 LESION OF SKIN OF SCALP: Status: ACTIVE | Noted: 2023-01-31

## 2023-02-03 PROBLEM — Z87.59 HISTORY OF MISCARRIAGE: Status: RESOLVED | Noted: 2023-02-03 | Resolved: 2023-02-03

## 2023-02-03 NOTE — HISTORY OF PRESENT ILLNESS
[FreeTextEntry1] : wellness visit [de-identified] : Patient without any concerns, here for her annual wellness visit.\par \par Reports having recent vaccinations, flow sheet updated.\par \par Healthcare proxy and status reviewed for advanced directives.

## 2023-02-03 NOTE — HEALTH RISK ASSESSMENT
[No falls in past year] : Patient reported no falls in the past year [PHQ-2 Negative - No further assessment needed] : PHQ-2 Negative - No further assessment needed [[id number]] : 0 [With Patient/Caregiver] : , with patient/caregiver [Reviewed no changes] : Reviewed, no changes [Designated Healthcare Proxy] : Designated healthcare proxy [Name: ___] : Health Care Proxy's Name: [unfilled]  [Relationship: ___] : Relationship: [unfilled] [DNR] : DNR [DNI] : DNI [I will adhere to the patient's wishes.] : I will adhere to the patient's wishes. [AdvancecareDate] : 01/23

## 2023-02-03 NOTE — REVIEW OF SYSTEMS
[Mole Changes] : no mole changes [Hair Changes] : no hair changes [Negative] : Heme/Lymph [de-identified] : lump on her scalp, not tender, present for a few years

## 2023-02-03 NOTE — PHYSICAL EXAM
[Normal Sclera/Conjunctiva] : normal sclera/conjunctiva [Normal TMs] : both tympanic membranes were normal [No Edema] : there was no peripheral edema [Deep Tendon Reflexes (DTR)] : deep tendon reflexes were 2+ and symmetric [Normal] : affect was normal and insight and judgment were intact [de-identified] : scalp, firrm nodular lesion noted, not mobile, no tenderness and no discoloraton or erythema or warmth.

## 2023-02-03 NOTE — REVIEW OF SYSTEMS
[Mole Changes] : no mole changes [Hair Changes] : no hair changes [Negative] : Heme/Lymph [de-identified] : lump on her scalp, not tender, present for a few years

## 2023-02-03 NOTE — HEALTH RISK ASSESSMENT
[No falls in past year] : Patient reported no falls in the past year [PHQ-2 Negative - No further assessment needed] : PHQ-2 Negative - No further assessment needed [[id number]] : 0 [With Patient/Caregiver] : , with patient/caregiver [Reviewed no changes] : Reviewed, no changes [Designated Healthcare Proxy] : Designated healthcare proxy [Name: ___] : Health Care Proxy's Name: [unfilled]  [Relationship: ___] : Relationship: [unfilled] [DNR] : DNR [DNI] : DNI [I will adhere to the patient's wishes.] : I will adhere to the patient's wishes. [AdvancecareDate] : 01/23

## 2023-02-03 NOTE — HISTORY OF PRESENT ILLNESS
[FreeTextEntry1] : wellness visit [de-identified] : Patient without any concerns, here for her annual wellness visit.\par \par Reports having recent vaccinations, flow sheet updated.\par \par Healthcare proxy and status reviewed for advanced directives.

## 2023-02-03 NOTE — PHYSICAL EXAM
[Normal Sclera/Conjunctiva] : normal sclera/conjunctiva [Normal TMs] : both tympanic membranes were normal [No Edema] : there was no peripheral edema [Deep Tendon Reflexes (DTR)] : deep tendon reflexes were 2+ and symmetric [Normal] : affect was normal and insight and judgment were intact [de-identified] : scalp, firrm nodular lesion noted, not mobile, no tenderness and no discoloraton or erythema or warmth.

## 2023-02-13 LAB
ALBUMIN SERPL ELPH-MCNC: 4.5 G/DL
ALP BLD-CCNC: 78 U/L
ALT SERPL-CCNC: 15 U/L
ANION GAP SERPL CALC-SCNC: 13 MMOL/L
AST SERPL-CCNC: 27 U/L
BASOPHILS # BLD AUTO: 0.05 K/UL
BASOPHILS NFR BLD AUTO: 0.7 %
BILIRUB SERPL-MCNC: 0.6 MG/DL
BUN SERPL-MCNC: 16 MG/DL
CALCIUM SERPL-MCNC: 10 MG/DL
CHLORIDE SERPL-SCNC: 100 MMOL/L
CHOLEST SERPL-MCNC: 199 MG/DL
CO2 SERPL-SCNC: 27 MMOL/L
CREAT SERPL-MCNC: 0.67 MG/DL
EGFR: 95 ML/MIN/1.73M2
EOSINOPHIL # BLD AUTO: 0.09 K/UL
EOSINOPHIL NFR BLD AUTO: 1.3 %
ESTIMATED AVERAGE GLUCOSE: 108 MG/DL
GLUCOSE SERPL-MCNC: 82 MG/DL
HBA1C MFR BLD HPLC: 5.4 %
HCT VFR BLD CALC: 40.4 %
HDLC SERPL-MCNC: 87 MG/DL
HGB BLD-MCNC: 13.3 G/DL
IMM GRANULOCYTES NFR BLD AUTO: 0.1 %
LDLC SERPL CALC-MCNC: 97 MG/DL
LYMPHOCYTES # BLD AUTO: 2.23 K/UL
LYMPHOCYTES NFR BLD AUTO: 32.7 %
MAN DIFF?: NORMAL
MCHC RBC-ENTMCNC: 31.7 PG
MCHC RBC-ENTMCNC: 32.9 GM/DL
MCV RBC AUTO: 96.4 FL
MONOCYTES # BLD AUTO: 0.53 K/UL
MONOCYTES NFR BLD AUTO: 7.8 %
NEUTROPHILS # BLD AUTO: 3.9 K/UL
NEUTROPHILS NFR BLD AUTO: 57.4 %
NONHDLC SERPL-MCNC: 112 MG/DL
PLATELET # BLD AUTO: 140 K/UL
POTASSIUM SERPL-SCNC: 4.2 MMOL/L
PROT SERPL-MCNC: 6.9 G/DL
RBC # BLD: 4.19 M/UL
RBC # FLD: 12.1 %
SODIUM SERPL-SCNC: 140 MMOL/L
TRIGL SERPL-MCNC: 74 MG/DL
WBC # FLD AUTO: 6.81 K/UL

## 2023-07-10 ENCOUNTER — RX RENEWAL (OUTPATIENT)
Age: 68
End: 2023-07-10

## 2023-08-15 ENCOUNTER — RX RENEWAL (OUTPATIENT)
Age: 68
End: 2023-08-15

## 2023-08-15 RX ORDER — LOVASTATIN 10 MG/1
10 TABLET ORAL
Qty: 90 | Refills: 3 | Status: ACTIVE | COMMUNITY
Start: 2020-01-17 | End: 1900-01-01

## 2023-12-27 ENCOUNTER — RX RENEWAL (OUTPATIENT)
Age: 68
End: 2023-12-27

## 2024-02-01 ENCOUNTER — NON-APPOINTMENT (OUTPATIENT)
Age: 69
End: 2024-02-01

## 2024-02-02 ENCOUNTER — APPOINTMENT (OUTPATIENT)
Dept: INTERNAL MEDICINE | Facility: CLINIC | Age: 69
End: 2024-02-02
Payer: MEDICARE

## 2024-02-02 VITALS — SYSTOLIC BLOOD PRESSURE: 128 MMHG | DIASTOLIC BLOOD PRESSURE: 80 MMHG

## 2024-02-02 VITALS
BODY MASS INDEX: 19.26 KG/M2 | TEMPERATURE: 98.5 F | HEART RATE: 79 BPM | DIASTOLIC BLOOD PRESSURE: 82 MMHG | HEIGHT: 61 IN | WEIGHT: 102 LBS | OXYGEN SATURATION: 98 % | SYSTOLIC BLOOD PRESSURE: 145 MMHG

## 2024-02-02 DIAGNOSIS — M81.0 AGE-RELATED OSTEOPOROSIS W/OUT CURRENT PATHOLOGICAL FRACTURE: ICD-10-CM

## 2024-02-02 DIAGNOSIS — Z00.00 ENCOUNTER FOR GENERAL ADULT MEDICAL EXAMINATION W/OUT ABNORMAL FINDINGS: ICD-10-CM

## 2024-02-02 DIAGNOSIS — R03.0 ELEVATED BLOOD-PRESSURE READING, W/OUT DIAGNOSIS OF HYPERTENSION: ICD-10-CM

## 2024-02-02 DIAGNOSIS — R74.8 ABNORMAL LEVELS OF OTHER SERUM ENZYMES: ICD-10-CM

## 2024-02-02 DIAGNOSIS — C44.91 BASAL CELL CARCINOMA OF SKIN, UNSPECIFIED: ICD-10-CM

## 2024-02-02 DIAGNOSIS — E78.5 HYPERLIPIDEMIA, UNSPECIFIED: ICD-10-CM

## 2024-02-02 PROCEDURE — G0439: CPT

## 2024-02-02 NOTE — HISTORY OF PRESENT ILLNESS
[de-identified] : 68 y.o. female, PMHx HLD, OP, borderline BP, BCC, elevated liver enzymes (normal recently).     Presents for CPE/wellness visit.   Feeling overall well.   BCC 2019, continues derm f/u, scheduled.  
I will START or STAY ON the medications listed below when I get home from the hospital:  None

## 2024-02-02 NOTE — ASSESSMENT
[FreeTextEntry1] : --- HM: Breast cancer screening - mammo 5/2022, BIRADs 1, will repeat  CRS - colonoscopy 5/2022, repeat 10 yr Skin cancer screening - scheduled Bone density - 3/2022, OP femoral neck (improved from 2018), due for repeat  Cervical cancer screening UTD - pap contesting 5/2022 Dental UTD - no changed, follows with periodontist for gingivitis, reports stable  tdap UTD - 2015 Pneumococcal vaccination completed Shingrix vaccine completed  Flu shot done in the fall Discussed RSV  check labs  OP: Improved BMP on alendronate (started 1/2019) Repeat BMD now, consider stopping (5 years) if continued improvement  HLD: Continues lovastatin, LDL 97  h/o transaminitis with antifungal treatment: resolved  BP elevation: Home readings 120-130/70s.  Ophthalmology last year, reports stable, will go for yearly check ups

## 2024-02-02 NOTE — HEALTH RISK ASSESSMENT
[No] : In the past 12 months have you used drugs other than those required for medical reasons? No [No falls in past year] : Patient reported no falls in the past year [0] : 2) Feeling down, depressed, or hopeless: Not at all (0) [PHQ-2 Negative - No further assessment needed] : PHQ-2 Negative - No further assessment needed [With Significant Other] : lives with significant other [] :  [Fully functional (bathing, dressing, toileting, transferring, walking, feeding)] : Fully functional (bathing, dressing, toileting, transferring, walking, feeding) [Fully functional (using the telephone, shopping, preparing meals, housekeeping, doing laundry, using] : Fully functional and needs no help or supervision to perform IADLs (using the telephone, shopping, preparing meals, housekeeping, doing laundry, using transportation, managing medications and managing finances) [Smoke Detector] : smoke detector [Carbon Monoxide Detector] : carbon monoxide detector [With Patient/Caregiver] : , with patient/caregiver [Relationship: ___] : Relationship: [unfilled] [Never] : Never [de-identified] : regular exercise - walks for 1 hour daily  [de-identified] : regular  [Reports changes in hearing] : Reports no changes in hearing [Reports changes in vision] : Reports no changes in vision [Reports changes in dental health] : Reports no changes in dental health [Guns at Home] : no guns at home [AdvancecareDate] : 02/24

## 2024-02-05 ENCOUNTER — TRANSCRIPTION ENCOUNTER (OUTPATIENT)
Age: 69
End: 2024-02-05

## 2024-02-05 LAB
25(OH)D3 SERPL-MCNC: 46.8 NG/ML
ALBUMIN SERPL ELPH-MCNC: 4.5 G/DL
ALP BLD-CCNC: 62 U/L
ALT SERPL-CCNC: 15 U/L
ANION GAP SERPL CALC-SCNC: 12 MMOL/L
AST SERPL-CCNC: 32 U/L
BILIRUB SERPL-MCNC: 0.7 MG/DL
BUN SERPL-MCNC: 22 MG/DL
CALCIUM SERPL-MCNC: 9.9 MG/DL
CHLORIDE SERPL-SCNC: 101 MMOL/L
CHOLEST SERPL-MCNC: 217 MG/DL
CO2 SERPL-SCNC: 28 MMOL/L
CREAT SERPL-MCNC: 0.75 MG/DL
EGFR: 86 ML/MIN/1.73M2
ESTIMATED AVERAGE GLUCOSE: 105 MG/DL
FOLATE SERPL-MCNC: 18.4 NG/ML
GLUCOSE SERPL-MCNC: 79 MG/DL
HBA1C MFR BLD HPLC: 5.3 %
HCT VFR BLD CALC: 42.5 %
HDLC SERPL-MCNC: 89 MG/DL
HGB BLD-MCNC: 14.4 G/DL
LDLC SERPL CALC-MCNC: 109 MG/DL
MAGNESIUM SERPL-MCNC: 2.1 MG/DL
MCHC RBC-ENTMCNC: 31.9 PG
MCHC RBC-ENTMCNC: 33.9 GM/DL
MCV RBC AUTO: 94.2 FL
NONHDLC SERPL-MCNC: 128 MG/DL
PLATELET # BLD AUTO: 143 K/UL
POTASSIUM SERPL-SCNC: 4.6 MMOL/L
PROT SERPL-MCNC: 6.9 G/DL
RBC # BLD: 4.51 M/UL
RBC # FLD: 12.2 %
SODIUM SERPL-SCNC: 141 MMOL/L
TRIGL SERPL-MCNC: 112 MG/DL
TSH SERPL-ACNC: 3.01 UIU/ML
VIT B12 SERPL-MCNC: 530 PG/ML
WBC # FLD AUTO: 6.28 K/UL

## 2024-03-05 ENCOUNTER — APPOINTMENT (OUTPATIENT)
Dept: ULTRASOUND IMAGING | Facility: IMAGING CENTER | Age: 69
End: 2024-03-05

## 2024-03-14 ENCOUNTER — APPOINTMENT (OUTPATIENT)
Dept: DERMATOLOGY | Facility: CLINIC | Age: 69
End: 2024-03-14
Payer: MEDICARE

## 2024-03-14 DIAGNOSIS — D22.9 MELANOCYTIC NEVI, UNSPECIFIED: ICD-10-CM

## 2024-03-14 DIAGNOSIS — Z86.018 PERSONAL HISTORY OF OTHER BENIGN NEOPLASM: ICD-10-CM

## 2024-03-14 DIAGNOSIS — L82.1 OTHER SEBORRHEIC KERATOSIS: ICD-10-CM

## 2024-03-14 DIAGNOSIS — Z12.83 ENCOUNTER FOR SCREENING FOR MALIGNANT NEOPLASM OF SKIN: ICD-10-CM

## 2024-03-14 DIAGNOSIS — Z85.828 PERSONAL HISTORY OF OTHER MALIGNANT NEOPLASM OF SKIN: ICD-10-CM

## 2024-03-14 DIAGNOSIS — L72.11 PILAR CYST: ICD-10-CM

## 2024-03-14 PROCEDURE — 99213 OFFICE O/P EST LOW 20 MIN: CPT

## 2024-03-14 NOTE — HISTORY OF PRESENT ILLNESS
[FreeTextEntry1] : skin check [de-identified] : ZAYDA OH is a 68 year old female presenting for:  1. Bump on scalp, asymptomatic, present for years.  2. Spots scattered on body x years. Asymptomatic and unchanged. No alleviating/aggravating factors. Never been treated.  Derm Hx: BCC on nose, treated with Mohs in 2019 Family Hx: no hx skin cancer

## 2024-03-14 NOTE — PHYSICAL EXAM
[FreeTextEntry3] : AAOx3, pleasant, NAD, no visual lymphadenopathy hair, scalp, face, nose, eyelids, ears, lips, oropharynx, neck, chest, abdomen, back, right arm, left arm, nails, and hands examined with all normal findings, pertinent findings include:  soft, mobile subcutaneous nodule on left parietal scalp  linear scar on nasal bridge, NER  multiple benign nevi and lentigines stuck on brown papules over trunk and extremities

## 2024-03-14 NOTE — ASSESSMENT
[FreeTextEntry1] : #Pilar cyst, scalp - Counseled about clinically benign lesion - Discussed excision for treatment/diagnosis - Reviewed risks (as well as mitigation strategies for adverse drug events), benefits, and alternatives of therapy  - Pt declines treatment at this time  #History of BCC, nasal bridge S/p Mohs 2019 - NER   #Multiple melanocytic nevi, benign  - Monitor moles for changes - Recommend wearing hats and sun protective clothing or sunscreen (SPF 30+, broad band) daily on your face and entire body (apply sunscreen atleast 30 minutes prior to going outside). Reapply sunscreen every 2 hours when outside.  #SK - I have discussed the benign nature and usual course with the patient, reassured.   #FBSE - counseled pt to notify us of any changing or concerning lesions - TBSE performed today, with 0 lesions concerning for malignancy - next TBSE due 1 year

## 2024-03-20 ENCOUNTER — RESULT REVIEW (OUTPATIENT)
Age: 69
End: 2024-03-20

## 2024-03-20 ENCOUNTER — APPOINTMENT (OUTPATIENT)
Dept: RADIOLOGY | Facility: IMAGING CENTER | Age: 69
End: 2024-03-20

## 2024-03-20 ENCOUNTER — APPOINTMENT (OUTPATIENT)
Dept: MAMMOGRAPHY | Facility: IMAGING CENTER | Age: 69
End: 2024-03-20
Payer: MEDICARE

## 2024-03-20 ENCOUNTER — OUTPATIENT (OUTPATIENT)
Dept: OUTPATIENT SERVICES | Facility: HOSPITAL | Age: 69
LOS: 1 days | End: 2024-03-20
Payer: MEDICARE

## 2024-03-20 DIAGNOSIS — Z00.00 ENCOUNTER FOR GENERAL ADULT MEDICAL EXAMINATION WITHOUT ABNORMAL FINDINGS: ICD-10-CM

## 2024-03-20 PROCEDURE — 77063 BREAST TOMOSYNTHESIS BI: CPT | Mod: 26

## 2024-03-20 PROCEDURE — 77080 DXA BONE DENSITY AXIAL: CPT

## 2024-03-20 PROCEDURE — 77067 SCR MAMMO BI INCL CAD: CPT

## 2024-03-20 PROCEDURE — 77080 DXA BONE DENSITY AXIAL: CPT | Mod: 26

## 2024-03-20 PROCEDURE — 77067 SCR MAMMO BI INCL CAD: CPT | Mod: 26

## 2024-03-20 PROCEDURE — 77063 BREAST TOMOSYNTHESIS BI: CPT

## 2024-03-21 RX ORDER — ALENDRONATE SODIUM 70 MG/1
70 TABLET ORAL
Qty: 12 | Refills: 1 | Status: COMPLETED | COMMUNITY
Start: 2021-09-02 | End: 2024-03-21

## 2024-04-16 ENCOUNTER — APPOINTMENT (OUTPATIENT)
Dept: INTERNAL MEDICINE | Facility: CLINIC | Age: 69
End: 2024-04-16
Payer: MEDICARE

## 2024-04-16 ENCOUNTER — LABORATORY RESULT (OUTPATIENT)
Age: 69
End: 2024-04-16

## 2024-04-16 VITALS
DIASTOLIC BLOOD PRESSURE: 81 MMHG | OXYGEN SATURATION: 97 % | HEIGHT: 61 IN | TEMPERATURE: 97.2 F | BODY MASS INDEX: 19.63 KG/M2 | SYSTOLIC BLOOD PRESSURE: 154 MMHG | WEIGHT: 104 LBS | HEART RATE: 71 BPM

## 2024-04-16 DIAGNOSIS — R10.9 UNSPECIFIED ABDOMINAL PAIN: ICD-10-CM

## 2024-04-16 PROCEDURE — 99214 OFFICE O/P EST MOD 30 MIN: CPT

## 2024-04-16 RX ORDER — NAPROXEN 500 MG/1
500 TABLET ORAL
Qty: 20 | Refills: 0 | Status: ACTIVE | COMMUNITY
Start: 2024-04-16 | End: 1900-01-01

## 2024-04-16 RX ORDER — TIZANIDINE 2 MG/1
2 TABLET ORAL
Qty: 15 | Refills: 0 | Status: ACTIVE | COMMUNITY
Start: 2024-04-16 | End: 1900-01-01

## 2024-04-16 NOTE — HISTORY OF PRESENT ILLNESS
[FreeTextEntry8] : 68 y.o. female, PMHx HLD, OP, borderline BP, BCC, elevated liver enzymes (normal recently). Presents with c/o right low back pain.   Started to feel some discomfort a few days ago, has gotten progressively worse.  Hurts with any movement.   Does not keep her awake at night.   Not much pain once up and moving but bending/twisting/turning will hurt.   Patient reaches a level of 3-4/10.   Does yoga daily.  Not sure if that strained her back. No new activities.  No prolonged sitting.  No GI/ symptoms.  No rash.

## 2024-04-16 NOTE — ASSESSMENT
[FreeTextEntry1] : --- right low back pain: likely muscle strain however with tenderness on abdominal exam with do lab work as ordered and abd US to r/o other etiology of pain start NSAID + muscle relaxant, topical menthol ointment

## 2024-04-16 NOTE — PHYSICAL EXAM
[No Acute Distress] : no acute distress [Well-Appearing] : well-appearing [Normal Voice/Communication] : normal voice/communication [Pedal Pulses Present] : the pedal pulses are present [No Edema] : there was no peripheral edema [Soft] : abdomen soft [Non-distended] : non-distended [No Masses] : no abdominal mass palpated [Normal Bowel Sounds] : normal bowel sounds [Normal] : no joint swelling and grossly normal strength and tone [No Rash] : no rash [Coordination Grossly Intact] : coordination grossly intact [No Focal Deficits] : no focal deficits [Normal Gait] : normal gait [de-identified] : mild tenderness in the back where she has been feeling the pain the deep palpation of the abdomen  [de-identified] : right low back pain with position changes/movement

## 2024-04-17 ENCOUNTER — TRANSCRIPTION ENCOUNTER (OUTPATIENT)
Age: 69
End: 2024-04-17

## 2024-04-17 LAB
ALBUMIN SERPL ELPH-MCNC: 4.8 G/DL
ALP BLD-CCNC: 68 U/L
ALT SERPL-CCNC: 14 U/L
AMYLASE/CREAT SERPL: 140 U/L
ANION GAP SERPL CALC-SCNC: 16 MMOL/L
APPEARANCE: CLEAR
AST SERPL-CCNC: 27 U/L
BASOPHILS # BLD AUTO: 0.04 K/UL
BASOPHILS NFR BLD AUTO: 0.4 %
BILIRUB SERPL-MCNC: 0.7 MG/DL
BILIRUBIN URINE: NEGATIVE
BLOOD URINE: NEGATIVE
BUN SERPL-MCNC: 23 MG/DL
CALCIUM SERPL-MCNC: 10 MG/DL
CHLORIDE SERPL-SCNC: 100 MMOL/L
CO2 SERPL-SCNC: 23 MMOL/L
COLOR: YELLOW
CREAT SERPL-MCNC: 0.78 MG/DL
EGFR: 82 ML/MIN/1.73M2
EOSINOPHIL # BLD AUTO: 0.02 K/UL
EOSINOPHIL NFR BLD AUTO: 0.2 %
GLUCOSE QUALITATIVE U: NEGATIVE MG/DL
GLUCOSE SERPL-MCNC: 164 MG/DL
HCT VFR BLD CALC: 44.4 %
HGB BLD-MCNC: 14.3 G/DL
IMM GRANULOCYTES NFR BLD AUTO: 0.2 %
KETONES URINE: NEGATIVE MG/DL
LEUKOCYTE ESTERASE URINE: ABNORMAL
LPL SERPL-CCNC: 35 U/L
LYMPHOCYTES # BLD AUTO: 1.17 K/UL
LYMPHOCYTES NFR BLD AUTO: 11.9 %
MAN DIFF?: NORMAL
MCHC RBC-ENTMCNC: 31.4 PG
MCHC RBC-ENTMCNC: 32.2 GM/DL
MCV RBC AUTO: 97.6 FL
MONOCYTES # BLD AUTO: 0.37 K/UL
MONOCYTES NFR BLD AUTO: 3.8 %
NEUTROPHILS # BLD AUTO: 8.21 K/UL
NEUTROPHILS NFR BLD AUTO: 83.5 %
NITRITE URINE: NEGATIVE
PH URINE: 5.5
PLATELET # BLD AUTO: 181 K/UL
POTASSIUM SERPL-SCNC: 4.8 MMOL/L
PROT SERPL-MCNC: 7.4 G/DL
PROTEIN URINE: NEGATIVE MG/DL
RBC # BLD: 4.55 M/UL
RBC # FLD: 12.8 %
SODIUM SERPL-SCNC: 139 MMOL/L
SPECIFIC GRAVITY URINE: 1.02
UROBILINOGEN URINE: 0.2 MG/DL
WBC # FLD AUTO: 9.83 K/UL

## 2024-04-18 ENCOUNTER — OUTPATIENT (OUTPATIENT)
Dept: OUTPATIENT SERVICES | Facility: HOSPITAL | Age: 69
LOS: 1 days | End: 2024-04-18
Payer: MEDICARE

## 2024-04-18 ENCOUNTER — APPOINTMENT (OUTPATIENT)
Dept: ULTRASOUND IMAGING | Facility: IMAGING CENTER | Age: 69
End: 2024-04-18
Payer: MEDICARE

## 2024-04-18 DIAGNOSIS — R10.9 UNSPECIFIED ABDOMINAL PAIN: ICD-10-CM

## 2024-04-18 PROCEDURE — 76700 US EXAM ABDOM COMPLETE: CPT

## 2024-04-18 PROCEDURE — 76700 US EXAM ABDOM COMPLETE: CPT | Mod: 26

## 2024-04-24 ENCOUNTER — TRANSCRIPTION ENCOUNTER (OUTPATIENT)
Age: 69
End: 2024-04-24

## 2024-04-29 ENCOUNTER — TRANSCRIPTION ENCOUNTER (OUTPATIENT)
Age: 69
End: 2024-04-29

## 2024-08-02 ENCOUNTER — RX RENEWAL (OUTPATIENT)
Age: 69
End: 2024-08-02

## 2025-04-02 ENCOUNTER — OUTPATIENT (OUTPATIENT)
Dept: OUTPATIENT SERVICES | Facility: HOSPITAL | Age: 70
LOS: 1 days | End: 2025-04-02

## 2025-04-02 ENCOUNTER — APPOINTMENT (OUTPATIENT)
Dept: INTERNAL MEDICINE | Facility: CLINIC | Age: 70
End: 2025-04-02
Payer: MEDICARE

## 2025-04-02 VITALS
SYSTOLIC BLOOD PRESSURE: 163 MMHG | HEIGHT: 61 IN | DIASTOLIC BLOOD PRESSURE: 90 MMHG | HEART RATE: 68 BPM | BODY MASS INDEX: 20.07 KG/M2 | OXYGEN SATURATION: 97 % | WEIGHT: 106.31 LBS

## 2025-04-02 DIAGNOSIS — Z87.898 PERSONAL HISTORY OF OTHER SPECIFIED CONDITIONS: ICD-10-CM

## 2025-04-02 DIAGNOSIS — M81.0 AGE-RELATED OSTEOPOROSIS W/OUT CURRENT PATHOLOGICAL FRACTURE: ICD-10-CM

## 2025-04-02 DIAGNOSIS — Z01.419 ENCOUNTER FOR GYNECOLOGICAL EXAMINATION (GENERAL) (ROUTINE) W/OUT ABNORMAL FINDINGS: ICD-10-CM

## 2025-04-02 DIAGNOSIS — R03.0 ELEVATED BLOOD-PRESSURE READING, W/OUT DIAGNOSIS OF HYPERTENSION: ICD-10-CM

## 2025-04-02 DIAGNOSIS — Z23 ENCOUNTER FOR IMMUNIZATION: ICD-10-CM

## 2025-04-02 DIAGNOSIS — Z98.890 OTHER SPECIFIED POSTPROCEDURAL STATES: ICD-10-CM

## 2025-04-02 DIAGNOSIS — Z00.00 ENCOUNTER FOR GENERAL ADULT MEDICAL EXAMINATION W/OUT ABNORMAL FINDINGS: ICD-10-CM

## 2025-04-02 DIAGNOSIS — Z12.39 ENCOUNTER FOR OTHER SCREENING FOR MALIGNANT NEOPLASM OF BREAST: ICD-10-CM

## 2025-04-02 DIAGNOSIS — Z12.83 ENCOUNTER FOR SCREENING FOR MALIGNANT NEOPLASM OF SKIN: ICD-10-CM

## 2025-04-02 DIAGNOSIS — E78.5 HYPERLIPIDEMIA, UNSPECIFIED: ICD-10-CM

## 2025-04-02 PROCEDURE — G0439: CPT

## 2025-04-04 DIAGNOSIS — R03.0 ELEVATED BLOOD-PRESSURE READING, WITHOUT DIAGNOSIS OF HYPERTENSION: ICD-10-CM

## 2025-04-04 DIAGNOSIS — M81.0 AGE-RELATED OSTEOPOROSIS WITHOUT CURRENT PATHOLOGICAL FRACTURE: ICD-10-CM

## 2025-04-04 DIAGNOSIS — Z23 ENCOUNTER FOR IMMUNIZATION: ICD-10-CM

## 2025-04-04 DIAGNOSIS — Z00.00 ENCOUNTER FOR GENERAL ADULT MEDICAL EXAMINATION WITHOUT ABNORMAL FINDINGS: ICD-10-CM

## 2025-04-04 DIAGNOSIS — E78.5 HYPERLIPIDEMIA, UNSPECIFIED: ICD-10-CM

## 2025-04-04 PROBLEM — Z87.898 HISTORY OF RIGHT FLANK PAIN: Status: RESOLVED | Noted: 2024-04-16 | Resolved: 2025-04-04

## 2025-04-04 PROBLEM — Z12.83 SKIN CANCER SCREENING: Status: RESOLVED | Noted: 2024-03-14 | Resolved: 2025-04-04

## 2025-04-04 PROBLEM — Z01.419 PAPANICOLAOU SMEAR, AS PART OF ROUTINE GYNECOLOGICAL EXAMINATION: Status: RESOLVED | Noted: 2022-05-02 | Resolved: 2025-04-04

## 2025-04-04 PROBLEM — Z98.890 HISTORY OF PAPANICOLAOU SMEAR: Status: RESOLVED | Noted: 2022-02-24 | Resolved: 2025-04-04

## 2025-04-04 LAB
ALBUMIN SERPL ELPH-MCNC: 4.7 G/DL
ALP BLD-CCNC: 88 U/L
ALT SERPL-CCNC: 17 U/L
ANION GAP SERPL CALC-SCNC: 11 MMOL/L
APO LP(A) SERPL-MCNC: 29.9 NMOL/L
AST SERPL-CCNC: 31 U/L
BASOPHILS # BLD AUTO: 0.05 K/UL
BASOPHILS NFR BLD AUTO: 0.9 %
BILIRUB SERPL-MCNC: 0.8 MG/DL
BUN SERPL-MCNC: 17 MG/DL
CALCIUM SERPL-MCNC: 9.7 MG/DL
CHLORIDE SERPL-SCNC: 101 MMOL/L
CHOLEST SERPL-MCNC: 247 MG/DL
CO2 SERPL-SCNC: 28 MMOL/L
CREAT SERPL-MCNC: 0.74 MG/DL
EGFRCR SERPLBLD CKD-EPI 2021: 86 ML/MIN/1.73M2
EOSINOPHIL # BLD AUTO: 0.05 K/UL
EOSINOPHIL NFR BLD AUTO: 0.9 %
GLUCOSE SERPL-MCNC: 86 MG/DL
HCT VFR BLD CALC: 45.2 %
HDLC SERPL-MCNC: 96 MG/DL
HGB BLD-MCNC: 15.3 G/DL
IMM GRANULOCYTES NFR BLD AUTO: 0.2 %
LDLC SERPL-MCNC: 133 MG/DL
LYMPHOCYTES # BLD AUTO: 1.91 K/UL
LYMPHOCYTES NFR BLD AUTO: 32.5 %
MAN DIFF?: NORMAL
MCHC RBC-ENTMCNC: 31.9 PG
MCHC RBC-ENTMCNC: 33.8 G/DL
MCV RBC AUTO: 94.4 FL
MONOCYTES # BLD AUTO: 0.34 K/UL
MONOCYTES NFR BLD AUTO: 5.8 %
NEUTROPHILS # BLD AUTO: 3.52 K/UL
NEUTROPHILS NFR BLD AUTO: 59.7 %
NONHDLC SERPL-MCNC: 152 MG/DL
PLATELET # BLD AUTO: 141 K/UL
POTASSIUM SERPL-SCNC: 4.3 MMOL/L
PROT SERPL-MCNC: 7.4 G/DL
RBC # BLD: 4.79 M/UL
RBC # FLD: 12.3 %
SODIUM SERPL-SCNC: 140 MMOL/L
TRIGL SERPL-MCNC: 111 MG/DL
TSH SERPL-ACNC: 3.81 UIU/ML
WBC # FLD AUTO: 5.88 K/UL

## 2025-04-04 RX ORDER — ATORVASTATIN CALCIUM 20 MG/1
20 TABLET, FILM COATED ORAL
Qty: 90 | Refills: 3 | Status: ACTIVE | COMMUNITY
Start: 2025-04-04 | End: 1900-01-01

## 2025-04-10 ENCOUNTER — APPOINTMENT (OUTPATIENT)
Dept: MAMMOGRAPHY | Facility: IMAGING CENTER | Age: 70
End: 2025-04-10
Payer: MEDICARE

## 2025-04-10 ENCOUNTER — RESULT REVIEW (OUTPATIENT)
Age: 70
End: 2025-04-10

## 2025-04-10 ENCOUNTER — APPOINTMENT (OUTPATIENT)
Dept: RADIOLOGY | Facility: IMAGING CENTER | Age: 70
End: 2025-04-10
Payer: MEDICARE

## 2025-04-10 ENCOUNTER — OUTPATIENT (OUTPATIENT)
Dept: OUTPATIENT SERVICES | Facility: HOSPITAL | Age: 70
LOS: 1 days | End: 2025-04-10
Payer: MEDICARE

## 2025-04-10 DIAGNOSIS — Z12.39 ENCOUNTER FOR OTHER SCREENING FOR MALIGNANT NEOPLASM OF BREAST: ICD-10-CM

## 2025-04-10 DIAGNOSIS — M81.0 AGE-RELATED OSTEOPOROSIS WITHOUT CURRENT PATHOLOGICAL FRACTURE: ICD-10-CM

## 2025-04-10 PROCEDURE — 77067 SCR MAMMO BI INCL CAD: CPT

## 2025-04-10 PROCEDURE — 77063 BREAST TOMOSYNTHESIS BI: CPT

## 2025-04-10 PROCEDURE — 77067 SCR MAMMO BI INCL CAD: CPT | Mod: 26

## 2025-04-10 PROCEDURE — 77063 BREAST TOMOSYNTHESIS BI: CPT | Mod: 26

## 2025-04-11 ENCOUNTER — TRANSCRIPTION ENCOUNTER (OUTPATIENT)
Age: 70
End: 2025-04-11

## 2025-04-14 ENCOUNTER — RESULT REVIEW (OUTPATIENT)
Age: 70
End: 2025-04-14

## 2025-04-14 DIAGNOSIS — R92.323 MAMMOGRAPHIC FIBROGLANDULAR DENSITY, BILATERAL BREASTS: ICD-10-CM

## 2025-04-14 DIAGNOSIS — R92.8 OTHER ABNORMAL AND INCONCLUSIVE FINDINGS ON DIAGNOSTIC IMAGING OF BREAST: ICD-10-CM

## 2025-04-22 ENCOUNTER — OUTPATIENT (OUTPATIENT)
Dept: OUTPATIENT SERVICES | Facility: HOSPITAL | Age: 70
LOS: 1 days | End: 2025-04-22
Payer: MEDICARE

## 2025-04-22 ENCOUNTER — APPOINTMENT (OUTPATIENT)
Dept: ULTRASOUND IMAGING | Facility: IMAGING CENTER | Age: 70
End: 2025-04-22
Payer: MEDICARE

## 2025-04-22 ENCOUNTER — RESULT REVIEW (OUTPATIENT)
Age: 70
End: 2025-04-22

## 2025-04-22 ENCOUNTER — APPOINTMENT (OUTPATIENT)
Dept: MAMMOGRAPHY | Facility: IMAGING CENTER | Age: 70
End: 2025-04-22
Payer: MEDICARE

## 2025-04-22 DIAGNOSIS — R92.323 MAMMOGRAPHIC FIBROGLANDULAR DENSITY, BILATERAL BREASTS: ICD-10-CM

## 2025-04-22 DIAGNOSIS — R92.8 OTHER ABNORMAL AND INCONCLUSIVE FINDINGS ON DIAGNOSTIC IMAGING OF BREAST: ICD-10-CM

## 2025-04-22 PROCEDURE — 76642 ULTRASOUND BREAST LIMITED: CPT | Mod: 26,RT

## 2025-04-22 PROCEDURE — 77065 DX MAMMO INCL CAD UNI: CPT

## 2025-04-22 PROCEDURE — 76642 ULTRASOUND BREAST LIMITED: CPT

## 2025-04-22 PROCEDURE — G0279: CPT

## 2025-04-22 PROCEDURE — G0279: CPT | Mod: 26

## 2025-04-22 PROCEDURE — 77065 DX MAMMO INCL CAD UNI: CPT | Mod: 26,RT

## 2025-05-01 ENCOUNTER — RESULT REVIEW (OUTPATIENT)
Age: 70
End: 2025-05-01

## 2025-05-01 ENCOUNTER — APPOINTMENT (OUTPATIENT)
Dept: MAMMOGRAPHY | Facility: IMAGING CENTER | Age: 70
End: 2025-05-01

## 2025-05-01 ENCOUNTER — OUTPATIENT (OUTPATIENT)
Dept: OUTPATIENT SERVICES | Facility: HOSPITAL | Age: 70
LOS: 1 days | End: 2025-05-01
Payer: MEDICARE

## 2025-05-01 DIAGNOSIS — R92.8 OTHER ABNORMAL AND INCONCLUSIVE FINDINGS ON DIAGNOSTIC IMAGING OF BREAST: ICD-10-CM

## 2025-05-01 DIAGNOSIS — R92.323 MAMMOGRAPHIC FIBROGLANDULAR DENSITY, BILATERAL BREASTS: ICD-10-CM

## 2025-05-01 PROCEDURE — 88305 TISSUE EXAM BY PATHOLOGIST: CPT | Mod: 26

## 2025-05-01 PROCEDURE — 88305 TISSUE EXAM BY PATHOLOGIST: CPT

## 2025-05-01 PROCEDURE — A4648: CPT

## 2025-05-01 PROCEDURE — 77065 DX MAMMO INCL CAD UNI: CPT | Mod: 26,RT

## 2025-05-01 PROCEDURE — 77065 DX MAMMO INCL CAD UNI: CPT

## 2025-05-01 PROCEDURE — 19081 BX BREAST 1ST LESION STRTCTC: CPT

## 2025-05-01 PROCEDURE — 19081 BX BREAST 1ST LESION STRTCTC: CPT | Mod: RT

## 2025-05-02 ENCOUNTER — APPOINTMENT (OUTPATIENT)
Dept: INTERNAL MEDICINE | Facility: CLINIC | Age: 70
End: 2025-05-02

## 2025-05-07 ENCOUNTER — NON-APPOINTMENT (OUTPATIENT)
Age: 70
End: 2025-05-07

## 2025-05-08 ENCOUNTER — APPOINTMENT (OUTPATIENT)
Dept: DERMATOLOGY | Facility: CLINIC | Age: 70
End: 2025-05-08
Payer: MEDICARE

## 2025-05-08 VITALS — WEIGHT: 110 LBS | BODY MASS INDEX: 20.78 KG/M2

## 2025-05-08 DIAGNOSIS — Z12.83 ENCOUNTER FOR SCREENING FOR MALIGNANT NEOPLASM OF SKIN: ICD-10-CM

## 2025-05-08 DIAGNOSIS — D22.9 MELANOCYTIC NEVI, UNSPECIFIED: ICD-10-CM

## 2025-05-08 DIAGNOSIS — L82.1 OTHER SEBORRHEIC KERATOSIS: ICD-10-CM

## 2025-05-08 PROCEDURE — 99213 OFFICE O/P EST LOW 20 MIN: CPT

## 2025-05-28 ENCOUNTER — OUTPATIENT (OUTPATIENT)
Dept: OUTPATIENT SERVICES | Facility: HOSPITAL | Age: 70
LOS: 1 days | End: 2025-05-28

## 2025-05-28 ENCOUNTER — APPOINTMENT (OUTPATIENT)
Dept: INTERNAL MEDICINE | Facility: CLINIC | Age: 70
End: 2025-05-28
Payer: MEDICARE

## 2025-05-28 VITALS — DIASTOLIC BLOOD PRESSURE: 78 MMHG | SYSTOLIC BLOOD PRESSURE: 150 MMHG

## 2025-05-28 VITALS
OXYGEN SATURATION: 99 % | BODY MASS INDEX: 20.01 KG/M2 | HEART RATE: 61 BPM | HEIGHT: 61 IN | DIASTOLIC BLOOD PRESSURE: 90 MMHG | TEMPERATURE: 97.8 F | SYSTOLIC BLOOD PRESSURE: 150 MMHG | WEIGHT: 106 LBS

## 2025-05-28 DIAGNOSIS — R03.0 ELEVATED BLOOD-PRESSURE READING, W/OUT DIAGNOSIS OF HYPERTENSION: ICD-10-CM

## 2025-05-28 DIAGNOSIS — I10 ESSENTIAL (PRIMARY) HYPERTENSION: ICD-10-CM

## 2025-05-28 PROCEDURE — G2211 COMPLEX E/M VISIT ADD ON: CPT

## 2025-05-28 PROCEDURE — 99214 OFFICE O/P EST MOD 30 MIN: CPT

## 2025-05-28 RX ORDER — AMLODIPINE BESYLATE 2.5 MG/1
2.5 TABLET ORAL
Qty: 90 | Refills: 3 | Status: ACTIVE | COMMUNITY
Start: 2025-05-28 | End: 1900-01-01

## 2025-05-28 RX ORDER — CHLORTHALIDONE 25 MG/1
25 TABLET ORAL DAILY
Qty: 45 | Refills: 3 | Status: ACTIVE | COMMUNITY
Start: 2025-05-28 | End: 1900-01-01

## 2025-06-03 DIAGNOSIS — I10 ESSENTIAL (PRIMARY) HYPERTENSION: ICD-10-CM

## 2025-08-06 ENCOUNTER — APPOINTMENT (OUTPATIENT)
Dept: DERMATOLOGY | Facility: CLINIC | Age: 70
End: 2025-08-06

## 2025-08-14 ENCOUNTER — APPOINTMENT (OUTPATIENT)
Dept: OBGYN | Facility: CLINIC | Age: 70
End: 2025-08-14

## 2025-08-14 VITALS
SYSTOLIC BLOOD PRESSURE: 150 MMHG | HEIGHT: 61 IN | DIASTOLIC BLOOD PRESSURE: 90 MMHG | WEIGHT: 105 LBS | BODY MASS INDEX: 19.83 KG/M2

## 2025-08-14 DIAGNOSIS — Z01.419 ENCOUNTER FOR GYNECOLOGICAL EXAMINATION (GENERAL) (ROUTINE) W/OUT ABNORMAL FINDINGS: ICD-10-CM

## 2025-08-14 DIAGNOSIS — Z11.51 ENCOUNTER FOR SCREENING FOR HUMAN PAPILLOMAVIRUS (HPV): ICD-10-CM

## 2025-08-14 PROCEDURE — G0101: CPT

## 2025-08-18 ENCOUNTER — NON-APPOINTMENT (OUTPATIENT)
Age: 70
End: 2025-08-18

## 2025-08-18 LAB — HPV HIGH+LOW RISK DNA PNL CVX: NOT DETECTED

## 2025-08-20 ENCOUNTER — TRANSCRIPTION ENCOUNTER (OUTPATIENT)
Age: 70
End: 2025-08-20

## 2025-08-20 LAB — CYTOLOGY CVX/VAG DOC THIN PREP: ABNORMAL

## 2025-09-10 ENCOUNTER — APPOINTMENT (OUTPATIENT)
Dept: INTERNAL MEDICINE | Facility: CLINIC | Age: 70
End: 2025-09-10
Payer: MEDICARE

## 2025-09-10 VITALS
HEART RATE: 68 BPM | OXYGEN SATURATION: 99 % | DIASTOLIC BLOOD PRESSURE: 76 MMHG | BODY MASS INDEX: 19.92 KG/M2 | SYSTOLIC BLOOD PRESSURE: 118 MMHG | HEIGHT: 61 IN | WEIGHT: 105.5 LBS

## 2025-09-10 DIAGNOSIS — Z01.419 ENCOUNTER FOR GYNECOLOGICAL EXAMINATION (GENERAL) (ROUTINE) W/OUT ABNORMAL FINDINGS: ICD-10-CM

## 2025-09-10 DIAGNOSIS — I10 ESSENTIAL (PRIMARY) HYPERTENSION: ICD-10-CM

## 2025-09-10 DIAGNOSIS — Z12.83 ENCOUNTER FOR SCREENING FOR MALIGNANT NEOPLASM OF SKIN: ICD-10-CM

## 2025-09-10 DIAGNOSIS — E78.5 HYPERLIPIDEMIA, UNSPECIFIED: ICD-10-CM

## 2025-09-10 DIAGNOSIS — Z12.11 ENCOUNTER FOR SCREENING FOR MALIGNANT NEOPLASM OF COLON: ICD-10-CM

## 2025-09-10 DIAGNOSIS — R92.8 OTHER ABNORMAL AND INCONCLUSIVE FINDINGS ON DIAGNOSTIC IMAGING OF BREAST: ICD-10-CM

## 2025-09-10 DIAGNOSIS — Z11.51 ENCOUNTER FOR SCREENING FOR HUMAN PAPILLOMAVIRUS (HPV): ICD-10-CM

## 2025-09-10 PROCEDURE — G2211 COMPLEX E/M VISIT ADD ON: CPT

## 2025-09-10 PROCEDURE — 99213 OFFICE O/P EST LOW 20 MIN: CPT

## 2025-09-10 RX ORDER — INFLUENZA A VIRUS A/CALIFORNIA/7/2009 X-179A (H1N1) ANTIGEN (FORMALDEHYDE INACTIVATED), INFLUENZA A VIRUS A/HONG KONG/4801/2014 X-263B (H3N2) ANTIGEN (FORMALDEHYDE INACTIVATED), AND INFLUENZA B VIRUS B/BRISBANE/60/2008 ANTIGEN (FORMALDEHYDE INACTIVATED) 60; 60; 60 UG/.5ML; UG/.5ML; UG/.5ML
INJECTION, SUSPENSION INTRAMUSCULAR
Qty: 1 | Refills: 0 | Status: ACTIVE | COMMUNITY
Start: 2025-09-10 | End: 1900-01-01